# Patient Record
Sex: FEMALE | URBAN - METROPOLITAN AREA
[De-identification: names, ages, dates, MRNs, and addresses within clinical notes are randomized per-mention and may not be internally consistent; named-entity substitution may affect disease eponyms.]

---

## 2022-05-11 ENCOUNTER — TELEPHONE (OUTPATIENT)
Dept: PRIMARY CARE CLINIC | Age: 22
End: 2022-05-11

## 2022-05-11 NOTE — TELEPHONE ENCOUNTER
----- Message from Hilario Birmingham DO sent at 5/11/2022 11:42 AM EDT -----  Cancel patient's appt with me tomorrow and notify her that she cannot reschedule with me due to 2 new patient no shows

## 2022-08-03 ENCOUNTER — OFFICE VISIT (OUTPATIENT)
Dept: INTERNAL MEDICINE CLINIC | Age: 22
End: 2022-08-03
Payer: COMMERCIAL

## 2022-08-03 VITALS
TEMPERATURE: 98.6 F | BODY MASS INDEX: 31.36 KG/M2 | HEIGHT: 63 IN | OXYGEN SATURATION: 98 % | HEART RATE: 115 BPM | WEIGHT: 177 LBS | DIASTOLIC BLOOD PRESSURE: 84 MMHG | SYSTOLIC BLOOD PRESSURE: 138 MMHG

## 2022-08-03 DIAGNOSIS — F41.9 ANXIETY AND DEPRESSION: Primary | ICD-10-CM

## 2022-08-03 DIAGNOSIS — Z76.89 ENCOUNTER TO ESTABLISH CARE: ICD-10-CM

## 2022-08-03 DIAGNOSIS — F32.A ANXIETY AND DEPRESSION: Primary | ICD-10-CM

## 2022-08-03 DIAGNOSIS — F32.A ANXIETY AND DEPRESSION: ICD-10-CM

## 2022-08-03 DIAGNOSIS — F41.9 ANXIETY AND DEPRESSION: ICD-10-CM

## 2022-08-03 LAB
A/G RATIO: 1.7 (ref 1.1–2.2)
ALBUMIN SERPL-MCNC: 4.5 G/DL (ref 3.4–5)
ALP BLD-CCNC: 53 U/L (ref 40–129)
ALT SERPL-CCNC: 13 U/L (ref 10–40)
ANION GAP SERPL CALCULATED.3IONS-SCNC: 13 MMOL/L (ref 3–16)
AST SERPL-CCNC: 27 U/L (ref 15–37)
BASOPHILS ABSOLUTE: 0.1 K/UL (ref 0–0.2)
BASOPHILS RELATIVE PERCENT: 0.9 %
BILIRUB SERPL-MCNC: <0.2 MG/DL (ref 0–1)
BUN BLDV-MCNC: 7 MG/DL (ref 7–20)
CALCIUM SERPL-MCNC: 9.8 MG/DL (ref 8.3–10.6)
CHLORIDE BLD-SCNC: 104 MMOL/L (ref 99–110)
CO2: 25 MMOL/L (ref 21–32)
CREAT SERPL-MCNC: 0.7 MG/DL (ref 0.6–1.1)
EOSINOPHILS ABSOLUTE: 0.1 K/UL (ref 0–0.6)
EOSINOPHILS RELATIVE PERCENT: 2.3 %
GFR AFRICAN AMERICAN: >60
GFR NON-AFRICAN AMERICAN: >60
GLUCOSE BLD-MCNC: 99 MG/DL (ref 70–99)
HCT VFR BLD CALC: 35.4 % (ref 36–48)
HEMOGLOBIN: 11.1 G/DL (ref 12–16)
LYMPHOCYTES ABSOLUTE: 1.9 K/UL (ref 1–5.1)
LYMPHOCYTES RELATIVE PERCENT: 30.4 %
MCH RBC QN AUTO: 23.9 PG (ref 26–34)
MCHC RBC AUTO-ENTMCNC: 31.3 G/DL (ref 31–36)
MCV RBC AUTO: 76.4 FL (ref 80–100)
MONOCYTES ABSOLUTE: 0.4 K/UL (ref 0–1.3)
MONOCYTES RELATIVE PERCENT: 6.7 %
NEUTROPHILS ABSOLUTE: 3.7 K/UL (ref 1.7–7.7)
NEUTROPHILS RELATIVE PERCENT: 59.7 %
PDW BLD-RTO: 16.3 % (ref 12.4–15.4)
PLATELET # BLD: 296 K/UL (ref 135–450)
PMV BLD AUTO: 8.9 FL (ref 5–10.5)
POTASSIUM SERPL-SCNC: 4.5 MMOL/L (ref 3.5–5.1)
RBC # BLD: 4.64 M/UL (ref 4–5.2)
SODIUM BLD-SCNC: 142 MMOL/L (ref 136–145)
T4 FREE: 1.1 NG/DL (ref 0.9–1.8)
TOTAL PROTEIN: 7.1 G/DL (ref 6.4–8.2)
TSH REFLEX: 0.98 UIU/ML (ref 0.27–4.2)
WBC # BLD: 6.2 K/UL (ref 4–11)

## 2022-08-03 PROCEDURE — 99204 OFFICE O/P NEW MOD 45 MIN: CPT | Performed by: NURSE PRACTITIONER

## 2022-08-03 RX ORDER — HYDROXYZINE HYDROCHLORIDE 25 MG/1
25 TABLET, FILM COATED ORAL 3 TIMES DAILY PRN
COMMUNITY
End: 2022-08-24 | Stop reason: SDUPTHER

## 2022-08-03 SDOH — ECONOMIC STABILITY: FOOD INSECURITY: WITHIN THE PAST 12 MONTHS, THE FOOD YOU BOUGHT JUST DIDN'T LAST AND YOU DIDN'T HAVE MONEY TO GET MORE.: SOMETIMES TRUE

## 2022-08-03 SDOH — ECONOMIC STABILITY: FOOD INSECURITY: WITHIN THE PAST 12 MONTHS, YOU WORRIED THAT YOUR FOOD WOULD RUN OUT BEFORE YOU GOT MONEY TO BUY MORE.: SOMETIMES TRUE

## 2022-08-03 ASSESSMENT — PATIENT HEALTH QUESTIONNAIRE - PHQ9
SUM OF ALL RESPONSES TO PHQ QUESTIONS 1-9: 17
8. MOVING OR SPEAKING SO SLOWLY THAT OTHER PEOPLE COULD HAVE NOTICED. OR THE OPPOSITE, BEING SO FIGETY OR RESTLESS THAT YOU HAVE BEEN MOVING AROUND A LOT MORE THAN USUAL: 0
5. POOR APPETITE OR OVEREATING: 2
3. TROUBLE FALLING OR STAYING ASLEEP: 3
9. THOUGHTS THAT YOU WOULD BE BETTER OFF DEAD, OR OF HURTING YOURSELF: 0
4. FEELING TIRED OR HAVING LITTLE ENERGY: 0
10. IF YOU CHECKED OFF ANY PROBLEMS, HOW DIFFICULT HAVE THESE PROBLEMS MADE IT FOR YOU TO DO YOUR WORK, TAKE CARE OF THINGS AT HOME, OR GET ALONG WITH OTHER PEOPLE: 1
SUM OF ALL RESPONSES TO PHQ QUESTIONS 1-9: 17
1. LITTLE INTEREST OR PLEASURE IN DOING THINGS: 3
7. TROUBLE CONCENTRATING ON THINGS, SUCH AS READING THE NEWSPAPER OR WATCHING TELEVISION: 3
SUM OF ALL RESPONSES TO PHQ QUESTIONS 1-9: 17
SUM OF ALL RESPONSES TO PHQ QUESTIONS 1-9: 17
2. FEELING DOWN, DEPRESSED OR HOPELESS: 3
SUM OF ALL RESPONSES TO PHQ9 QUESTIONS 1 & 2: 6
6. FEELING BAD ABOUT YOURSELF - OR THAT YOU ARE A FAILURE OR HAVE LET YOURSELF OR YOUR FAMILY DOWN: 3

## 2022-08-03 ASSESSMENT — SOCIAL DETERMINANTS OF HEALTH (SDOH): HOW HARD IS IT FOR YOU TO PAY FOR THE VERY BASICS LIKE FOOD, HOUSING, MEDICAL CARE, AND HEATING?: HARD

## 2022-08-03 NOTE — PROGRESS NOTES
Patient: Alta Gannon is a 24 y.o. female who presents today with the following Chief Complaint(s):  Chief Complaint   Patient presents with    New Patient     Establish care       HPI  Presents to the office as a new patient to establish care. C/o depression, anxiety, ADHD. Has tried sertraline in the past which cause suicidal thoughts. Is taking hydroxyzine which does not help much. 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 Feels like she is being unproductive if she is not busy. - Feels like previous telehealth therapist did not provide much help. Mental Health Problem  The primary symptoms include dysphoric mood. The current episode started more than 1 month ago. This is a chronic problem. She characterizes the problem as moderate. The mood includes feelings of sadness and tearfulness. Her change in mood was precipitated by a stressful event. The onset of the illness is precipitated by emotional stress. The degree of incapacity that she is experiencing as a consequence of her illness is moderate. Sequelae of the illness include harmed interpersonal relations. Additional symptoms of the illness include anhedonia, fatigue, feelings of worthlessness and attention impairment. She does not admit to suicidal ideas. She does not have a plan to attempt suicide. She does not contemplate harming herself. She has not already injured self. She does not contemplate injuring another person. She has not already  injured another person. Risk factors that are present for mental illness include a history of mental illness (PTSD). Current Outpatient Medications   Medication Sig Dispense Refill    hydrOXYzine HCl (ATARAX) 25 MG tablet Take 25 mg by mouth 3 times daily as needed for Itching       No current facility-administered medications for this visit. Patient's past medical history, surgical history, family history, medications,  and allergies  were all reviewed and updated as appropriate today.     There is no problem list on file for this patient. No past medical history on file. No past surgical history on file. No family history on file. Allergies   Allergen Reactions    Latex     Zinc Gelatin [Zinc]          Review of Systems   Constitutional:  Positive for fatigue. HENT: Negative. Respiratory: Negative. Cardiovascular: Negative. Gastrointestinal: Negative. Genitourinary: Negative. Musculoskeletal: Negative. Skin: Negative. Neurological: Negative. Psychiatric/Behavioral:  Positive for decreased concentration and dysphoric mood. Negative for self-injury and suicidal ideas. The patient is nervous/anxious. Vitals:    08/03/22 1030   BP: 138/84   Site: Left Upper Arm   Position: Sitting   Cuff Size: Medium Adult   Pulse: (!) 115   Temp: 98.6 °F (37 °C)   TempSrc: Temporal   SpO2: 98%   Weight: 177 lb (80.3 kg)   Height: 5' 3\" (1.6 m)     Physical Exam  Constitutional:       General: She is not in acute distress. Appearance: Normal appearance. She is not ill-appearing, toxic-appearing or diaphoretic. HENT:      Head: Normocephalic. Cardiovascular:      Rate and Rhythm: Regular rhythm. Tachycardia present. Pulses: Normal pulses. Heart sounds: Normal heart sounds. No murmur heard. No friction rub. No gallop. Pulmonary:      Effort: Pulmonary effort is normal. No respiratory distress. Breath sounds: Normal breath sounds. No stridor. No wheezing, rhonchi or rales. Abdominal:      General: Bowel sounds are normal. There is no distension. Palpations: Abdomen is soft. There is no mass. Tenderness: There is no abdominal tenderness. There is no guarding. Hernia: No hernia is present. Musculoskeletal:         General: Normal range of motion. Cervical back: Normal range of motion and neck supple. No rigidity. Right lower leg: No edema. Left lower leg: No edema. Skin:     General: Skin is warm and dry.    Neurological:      Mental Status: She is alert and oriented to person, place, and time. Psychiatric:         Attention and Perception: Attention and perception normal.         Mood and Affect: Mood is anxious. Affect is tearful. Speech: Speech normal.         Behavior: Behavior normal.         Thought Content: Thought content normal.          Assessment/Plan:  1. Anxiety and depression  - TSH with Reflex; Future  - T4, Free; Future  - CBC with Auto Differential; Future  - Comprehensive Metabolic Panel; Future  - External Referral to Psychiatry    2. Encounter to establish care        Return in about 2 months (around 10/3/2022), or if symptoms worsen or fail to improve.

## 2022-08-03 NOTE — PATIENT INSTRUCTIONS
I will send a message or call if your lab work is abnormal.     [facility] Laboratory Locations - No appointment necessary. @ indicates the location is open Saturdays in addition to Monday through Friday. Call your preferred location for test preparation, business hours and other information you need. [employer] accepts [employer]. [facility]    @ [place] Lab Svcs. [address] [place], [facility] [address]   Ph: [phone] [facility] Lab Svcs. [address]   Ph: [phone]  @ [facility] Lab Svcs. 3[address] [facility]   Ph: [phone]    [facility] Lab Svcs. [address]   Ph: [phone] @ [place] Lab Svcs. [address]  Ph: [phone] @ [place] [facility] Svcs. [address], [place] [facility] 429   Ph: [phone]     Mikael Vergara [facility]. [facility] [address]  Ph: [phone]    [place]   @ [place] Lab Svcs. [address] [place], [place] [address]   Ph: [phone] Karen Roberts Med. Office [facility]. [address] [place] [address] Karen Roberts, [address] [place] [facility]  Ph: [address]:  [address] Lab Svcs. 54 [facility]   Ph: [phone] [facility]. Lab Svcs.   [address] [facility], [facility]   Ph: [phone]

## 2022-08-05 ASSESSMENT — ENCOUNTER SYMPTOMS
GASTROINTESTINAL NEGATIVE: 1
RESPIRATORY NEGATIVE: 1

## 2022-08-24 ENCOUNTER — OFFICE VISIT (OUTPATIENT)
Dept: PSYCHIATRY | Age: 22
End: 2022-08-24
Payer: COMMERCIAL

## 2022-08-24 VITALS
OXYGEN SATURATION: 99 % | HEART RATE: 111 BPM | WEIGHT: 177 LBS | DIASTOLIC BLOOD PRESSURE: 80 MMHG | SYSTOLIC BLOOD PRESSURE: 118 MMHG | BODY MASS INDEX: 31.35 KG/M2

## 2022-08-24 DIAGNOSIS — F32.A DEPRESSION, UNSPECIFIED DEPRESSION TYPE: ICD-10-CM

## 2022-08-24 DIAGNOSIS — F41.1 GAD (GENERALIZED ANXIETY DISORDER): Primary | ICD-10-CM

## 2022-08-24 DIAGNOSIS — F41.0 PANIC DISORDER: ICD-10-CM

## 2022-08-24 PROCEDURE — 99205 OFFICE O/P NEW HI 60 MIN: CPT | Performed by: REGISTERED NURSE

## 2022-08-24 PROCEDURE — G8427 DOCREV CUR MEDS BY ELIG CLIN: HCPCS | Performed by: REGISTERED NURSE

## 2022-08-24 PROCEDURE — 1036F TOBACCO NON-USER: CPT | Performed by: REGISTERED NURSE

## 2022-08-24 PROCEDURE — G8417 CALC BMI ABV UP PARAM F/U: HCPCS | Performed by: REGISTERED NURSE

## 2022-08-24 RX ORDER — HYDROXYZINE HYDROCHLORIDE 25 MG/1
25 TABLET, FILM COATED ORAL EVERY 8 HOURS PRN
Qty: 90 TABLET | Refills: 0 | Status: SHIPPED | OUTPATIENT
Start: 2022-08-24 | End: 2022-09-23

## 2022-08-24 RX ORDER — BUSPIRONE HYDROCHLORIDE 5 MG/1
5 TABLET ORAL 3 TIMES DAILY
Qty: 90 TABLET | Refills: 0 | Status: SHIPPED | OUTPATIENT
Start: 2022-08-24 | End: 2022-09-23

## 2022-08-24 ASSESSMENT — PATIENT HEALTH QUESTIONNAIRE - PHQ9
SUM OF ALL RESPONSES TO PHQ QUESTIONS 1-9: 10
4. FEELING TIRED OR HAVING LITTLE ENERGY: 2
9. THOUGHTS THAT YOU WOULD BE BETTER OFF DEAD, OR OF HURTING YOURSELF: 0
SUM OF ALL RESPONSES TO PHQ QUESTIONS 1-9: 10
8. MOVING OR SPEAKING SO SLOWLY THAT OTHER PEOPLE COULD HAVE NOTICED. OR THE OPPOSITE, BEING SO FIGETY OR RESTLESS THAT YOU HAVE BEEN MOVING AROUND A LOT MORE THAN USUAL: 0
2. FEELING DOWN, DEPRESSED OR HOPELESS: 1
5. POOR APPETITE OR OVEREATING: 3
SUM OF ALL RESPONSES TO PHQ QUESTIONS 1-9: 10
1. LITTLE INTEREST OR PLEASURE IN DOING THINGS: 0
7. TROUBLE CONCENTRATING ON THINGS, SUCH AS READING THE NEWSPAPER OR WATCHING TELEVISION: 0
3. TROUBLE FALLING OR STAYING ASLEEP: 2
6. FEELING BAD ABOUT YOURSELF - OR THAT YOU ARE A FAILURE OR HAVE LET YOURSELF OR YOUR FAMILY DOWN: 2
SUM OF ALL RESPONSES TO PHQ QUESTIONS 1-9: 10
SUM OF ALL RESPONSES TO PHQ9 QUESTIONS 1 & 2: 1

## 2022-08-24 ASSESSMENT — ANXIETY QUESTIONNAIRES
6. BECOMING EASILY ANNOYED OR IRRITABLE: 3
GAD7 TOTAL SCORE: 16
3. WORRYING TOO MUCH ABOUT DIFFERENT THINGS: 3
5. BEING SO RESTLESS THAT IT IS HARD TO SIT STILL: 0
7. FEELING AFRAID AS IF SOMETHING AWFUL MIGHT HAPPEN: 2
2. NOT BEING ABLE TO STOP OR CONTROL WORRYING: 3
1. FEELING NERVOUS, ANXIOUS, OR ON EDGE: 3
4. TROUBLE RELAXING: 2

## 2022-08-24 NOTE — PROGRESS NOTES
Psychiatry Initial Consultation    Patient Name: Indira Gomes  MRN: [id number]  Date of Service: 8/24/2022     Referring provider for consult: NATALIYA Matthew    Reason for Consult:  Anxiety, depression, panic d/o  Dx:  1. MARLENA (generalized anxiety disorder)  -     Vitamin D 25 Hydroxy; Future  -     Vitamin B12 & Folate; Future  2. Depression, unspecified depression type  3. Panic disorder  Assessment and plan    Anxiety, depression, and panic disorder  - Start on Buspar 5 mg TID. Patient can start at lower dose if can't tolerate it. - Continue Hydroxyzine 10-25 mg three times a day as needed for anxiety/panic d/o  - reviewed genesight test results with patient. - labs reviewed and ordered. - Medication R/B/SE discussed and patient gave verbal consent for tx.   - Practice complementary health approaches such as: self-management strategies, relaxation techniques, yoga, and physical exercise as tolerated. 2. Psychotherapy   - continue outpatient therapy as scheduled. - advised relaxation techniques and resources discussed. 3. Medical   - follow with PCP  4. RTC in 6 weeks or earlier if your symptoms fail to improve or go to nearest ER if having active SI/HI. Evaluated medications and assessed for side effects and effectiveness. Assessed patient's educational needs including reviewing plan of care, medications,diagnosis, treatment options, and prognosis. I reviewed the plan of care with the patient and the patient consented to the treatment interventions. Patient acknowledged, verbalized understanding, and agreed with plan of care. HPI:   This is a 24 y.o., female  here today for psychiatric evaluation onsite at [address] [place] [address] E MD . The patient is here for anxiety, depression and panic d/o evaluation and tx. She states mental health issues \" runs in the family. \" She had ED visit last year with panic attacks.  She was following counseling through students' service at the [facility] [employer] [facility] and later established with psych NP at [address] [place] [address] in which she has been getting medication management and counseling services. She reflected her experiences and services at [address] [place] [address]. She unsatisfied with the care she has been receiving at [facility] which lead to today's visit with me. She tried Zoloft for 5 days and stopped it as it increased SI. She has been taking Atarax/hydroxyzine 10-25 mg three times a day but she she states \" it makes me drowsy. \" There have been lots stress factor since last year. Her mom had stoke in Oct 2021 and been through rehab and recovering fairly well. Her relationship with her dad has not been great due to his past alcohol abuse. She calls and talks to her mom regularly. She moved her to [place] in August 2020 to pursue her undergraduate education at [employer]. She has been working up to 60 hours a week and going FT school. The expectations from her job ( now works at [employer]) and school have been on a rise. There are school and work related stress. She experienced her first panic attacks in Dec 2021 associated with Chest tightness, SOB, hyperventilating and other physical symptoms. She reports hydroxyzine has been helping to manage anxiety attacks. She feels depression symptoms are better now. However, she reports difficulty of falling asleep unless she takes Atarax. Sometimes she does not eat well and at times eat more than her usual food portion. She endorses feeling bad about her self. She feels anxious, nervous, worrying too much, and easily annoyed or irritable. She was in tears during the interview at times. She is very skeptical about antidepressants or anxiety medication. Discussed with the patient in detail the side effects, how long it takes to work, and medication compliance. The patient denies SI/HI/AVH. She denies maribell, hypomania, or delusions.      Psychiatric Focused ROS:  Depressive sxs: depressed mood, insomnia, feeling bad about her self  Manic or hypomanic sxs : denies   Anxiety sxs:      Constant worry:Yes     Irritability/ edgy:Yes     Disrupted sleep:Yes     Somatic/ tension: yes     Restless/ fatigue:Yes  Psychotic sxs: Denies AVH, paranoia, delusions  ADHD: denies   PTSD: denies   Context:  office visit  Location: mind- anxiety, depressed mood, insomnia  Duration/Timing:  intermittent. Severity/ character: moderate   Associated symptoms:  As above  Modifying factors: progression of illness, life stress, work and school stress. Disposition: The patient does not require inpatient psychiatric admission. Risk factors: Anxiety, depression, panic d/o, age < 22 y.o . She is not currently an imminent safety risk as she denies SI/HI, is future oriented and expresses a desire to get better and healthier. Protective factors: family    Past Psychiatric History:    Previous Diagnoses: denies   Previous Hospitalizations: Dec 2021 with panic attacks and left without seen as it took them to be seen. Outpatient Treatment: [facility] with Paul Veloz ( psych NP) , [facility] ( Temporary counseling student service)   Past Medication Trials: Zoloft ( ^ SI),   Suicidality: denies   History of Violence: denies   Family Hx psychiatric disorders: dad- alcoholic, uncle- schizophrenia  Brother- \" psychotic breaks\"  Family hx SA/ completed suicides: brother- attempted suicide. Past Medical History:  No past medical history on file. Home Medications:  Prior to Admission medications    Medication Sig Start Date End Date Taking?  Authorizing Provider   busPIRone (BUSPAR) 5 MG tablet Take 1 tablet by mouth 3 times daily 8/24/22 9/23/22 Yes NATALIYA Bowden CNP   hydrOXYzine HCl (ATARAX) 25 MG tablet Take 1 tablet by mouth every 8 hours as needed for Anxiety 8/24/22 9/23/22 Yes NATALIYA Bowden CNP      Allergies  Allergies   Allergen Reactions    Latex     Zinc Gelatin [Zinc]       Chemical Dependency History:   Social History     Tobacco Use    Smoking status: Never Passive exposure: Never    Smokeless tobacco: Never   Substance Use Topics    Alcohol use: Never        Illicit: denies     ETOH:denies     Nicotine: denies     Caffeine: none. Family Hx:    No family history on file. Social Hx:     Developmental: Born [place] raised in [place], [place]. Moved to [place] in August 2020 when she accepted . Marital Status: single. Children: none    Housing: lives with three roommates off [address] [facility]. Educational: Senior at [employer] and minor in [address] [place] [address] Road: student     Abuse/Trauma: difficult relationship with her dad due to dad being alcoholic. Legal: denies     Gun: No access to gun or own a gun. Current Medications Ordered:  No current outpatient medications on file prior to visit. No current facility-administered medications on file prior to visit. ROS: + CP, constipation- during panic attacks, anxiety. PE:    /80 (Site: Right Upper Arm, Position: Sitting, Cuff Size: Large Adult)   Pulse (!) 111   Wt 177 lb (80.3 kg)   SpO2 99%   BMI 31.35 kg/m²     MARLENA 7 SCORE 8/24/2022   MARLENA-7 Total Score 16     Interpretation of MARLENA-7 score: 5-9 = mild anxiety, 10-14 = moderate anxiety,   15+ = severe anxiety. Recommend referral to behavioral health for scores 10 or greater. PHQ-9 Total Score: 10 (8/24/2022  9:47 AM)  Thoughts that you would be better off dead, or of hurting yourself in some way: Not at all (8/24/2022  9:47 AM)  Interpretation of PHQ-9 score:  1-4 = minimal depression, 5-9 = mild depression,   10-14 = moderate depression; 15-19 = moderately severe depression, 20-27 = severe depression  Motor / Gait: no abnormalities noted, normal gait and station  AIMS: 0  LAB: up to date     Mental Status Examination  Appearance    alert, cooperative  Motor: Normal strength and tone, No abnormal movements, tics or mannerisms.   Speech    spontaneous  Mood/Affect    Anxious  Irritability / anxiety  Thought Process    linear, goal directed, and coherent  Thought Content    intact , no suicidal ideation  Associations    logical connections  Attention/Concentration    intact  Memory    recent and remote memory intact  Insight/Judgement    Good / Intact    Safety plan  Discussed and educated patient to call 911 or go to nearest emergency room if patient experiences SI/HI immediately. In addition, Patient educated to use the National Suicide Hotlines: 1-107-010-TALK (0-610.115.4121) and 7-131-LMBVWNW (3-327.836.6466) and Local psychiatric Emergency Services given to patient during the office visit. Total time spent on this encounter: Not billed by time    Thank you for consult. Please do not hesitate to contact provider if there are additional questions regarding patient.     Ignacio Vuong DNP, PMHNP-BC, CNP  8/24/2022

## 2022-10-03 ENCOUNTER — OFFICE VISIT (OUTPATIENT)
Dept: INTERNAL MEDICINE CLINIC | Age: 22
End: 2022-10-03
Payer: COMMERCIAL

## 2022-10-03 VITALS
DIASTOLIC BLOOD PRESSURE: 78 MMHG | SYSTOLIC BLOOD PRESSURE: 112 MMHG | OXYGEN SATURATION: 99 % | BODY MASS INDEX: 31.89 KG/M2 | HEIGHT: 63 IN | WEIGHT: 180 LBS | HEART RATE: 93 BPM

## 2022-10-03 DIAGNOSIS — F41.9 ANXIETY AND DEPRESSION: Primary | ICD-10-CM

## 2022-10-03 DIAGNOSIS — F32.A ANXIETY AND DEPRESSION: Primary | ICD-10-CM

## 2022-10-03 PROCEDURE — G8484 FLU IMMUNIZE NO ADMIN: HCPCS | Performed by: NURSE PRACTITIONER

## 2022-10-03 PROCEDURE — 1036F TOBACCO NON-USER: CPT | Performed by: NURSE PRACTITIONER

## 2022-10-03 PROCEDURE — G8427 DOCREV CUR MEDS BY ELIG CLIN: HCPCS | Performed by: NURSE PRACTITIONER

## 2022-10-03 PROCEDURE — G8417 CALC BMI ABV UP PARAM F/U: HCPCS | Performed by: NURSE PRACTITIONER

## 2022-10-03 PROCEDURE — 99213 OFFICE O/P EST LOW 20 MIN: CPT | Performed by: NURSE PRACTITIONER

## 2022-10-03 RX ORDER — HYDROXYZINE HYDROCHLORIDE 25 MG/1
25 TABLET, FILM COATED ORAL 3 TIMES DAILY PRN
COMMUNITY
End: 2022-10-05 | Stop reason: DRUGHIGH

## 2022-10-03 RX ORDER — BUSPIRONE HYDROCHLORIDE 5 MG/1
5 TABLET ORAL 3 TIMES DAILY
COMMUNITY
End: 2022-10-05 | Stop reason: DRUGHIGH

## 2022-10-03 NOTE — PATIENT INSTRUCTIONS
Try to avoid excess snacking and focus on healthy foods  Avoid screen time 1-2 hours before bed    [facility] Laboratory Locations - No appointment necessary. @ indicates the location is open Saturdays in addition to Monday through Friday. Call your preferred location for test preparation, business hours and other information you need. [employer] accepts [employer]. [facility]    @ [place] Lab Svcs. 3 [facility] [id number] [place] [address]. [place], [address]   Ph: [phone] [facility] Lab Svcs. [address]   Ph: [phone]  @ [place] Lab Svcs. 3[address] [facility]   Ph: [phone]    [facility] Lab Svcs. 2001 [facility], [address]   Ph: [phone] @ [place] Lab Svcs. [address]  Ph: [phone] @ [facility] Lab Svcs. 3215 [facility]. [address] Barker [address]   Ph: [phone]     Derrek Ray Svcs. [place] [address]  Ph: [phone]    [place]   @ [place] Lab Svcs. [address] [place], [place] [address]   Ph: [phone] [facility] Med. Office Bldg. [address] [facility], 800 [facility]  Ph: [phone] [facility] 95, [phone]   [facility]:  [address] Lab Svcs. 54 [facility]   Ph: [phone] [facility]. Lab Svcs.   211 [facility], [address] [place] [address]   Ph: [phone]

## 2022-10-03 NOTE — PROGRESS NOTES
Patient: Terrace Mcardle is a 24 y.o. female who presents today with the following Chief Complaint(s):  Chief Complaint   Patient presents with    Follow-up     2 mth f/u       HPI  Presents to the office for anxiety follow up. She is being followed by psychiatry. Taking buspirone and hydroxyzine. Says she is now having trouble sleeping. Believes it is r/t screen time. She is usually on her phone until 11pm and has trouble falling asleep. Current Outpatient Medications   Medication Sig Dispense Refill    busPIRone (BUSPAR) 5 MG tablet Take 5 mg by mouth 3 times daily      hydrOXYzine HCl (ATARAX) 25 MG tablet Take 25 mg by mouth 3 times daily as needed for Itching       No current facility-administered medications for this visit. Patient's past medical history, surgical history, family history, medications,  and allergies  were all reviewed and updated as appropriate today. There is no problem list on file for this patient. Past Medical History:   Diagnosis Date    Anxiety     Depression       No past surgical history on file. No family history on file. Allergies   Allergen Reactions    Latex     Zinc Gelatin [Zinc]          Review of Systems   Constitutional:  Positive for fatigue. HENT: Negative. Respiratory: Negative. Cardiovascular: Negative. Gastrointestinal: Negative. Genitourinary: Negative. Musculoskeletal: Negative. Skin: Negative. Neurological: Negative. Psychiatric/Behavioral:  Positive for decreased concentration, dysphoric mood and sleep disturbance. Negative for self-injury and suicidal ideas. The patient is nervous/anxious. Vitals:    10/03/22 1637   BP: 112/78   Site: Left Upper Arm   Position: Sitting   Cuff Size: Medium Adult   Pulse: 93   SpO2: 99%   Weight: 180 lb (81.6 kg)   Height: 5' 3\" (1.6 m)     Physical Exam  Constitutional:       General: She is not in acute distress. Appearance: Normal appearance.  She is not ill-appearing, toxic-appearing or diaphoretic. HENT:      Head: Normocephalic. Cardiovascular:      Rate and Rhythm: Normal rate and regular rhythm. Pulses: Normal pulses. Heart sounds: Normal heart sounds. Pulmonary:      Effort: Pulmonary effort is normal. No respiratory distress. Breath sounds: Normal breath sounds. No stridor. No wheezing, rhonchi or rales. Abdominal:      General: Bowel sounds are normal. There is no distension. Palpations: Abdomen is soft. There is no mass. Tenderness: There is no abdominal tenderness. There is no guarding. Hernia: No hernia is present. Musculoskeletal:         General: Normal range of motion. Cervical back: Normal range of motion and neck supple. No rigidity. Skin:     General: Skin is warm and dry. Neurological:      Mental Status: She is alert and oriented to person, place, and time. Psychiatric:         Attention and Perception: Attention and perception normal.         Mood and Affect: Mood and affect normal.         Speech: Speech normal.         Behavior: Behavior normal.         Thought Content: Thought content normal.          Assessment/Plan:  1. Anxiety and depression  - Have labs drawn  - Continue buspirone and hydroxyzine  - F/u with psychiatry       Return if symptoms worsen or fail to improve.

## 2022-10-04 ASSESSMENT — ENCOUNTER SYMPTOMS
GASTROINTESTINAL NEGATIVE: 1
RESPIRATORY NEGATIVE: 1

## 2022-10-05 ENCOUNTER — OFFICE VISIT (OUTPATIENT)
Dept: PSYCHIATRY | Age: 22
End: 2022-10-05
Payer: COMMERCIAL

## 2022-10-05 VITALS
DIASTOLIC BLOOD PRESSURE: 78 MMHG | SYSTOLIC BLOOD PRESSURE: 118 MMHG | BODY MASS INDEX: 32.24 KG/M2 | OXYGEN SATURATION: 95 % | HEART RATE: 54 BPM | WEIGHT: 182 LBS

## 2022-10-05 DIAGNOSIS — F41.1 GAD (GENERALIZED ANXIETY DISORDER): Primary | ICD-10-CM

## 2022-10-05 DIAGNOSIS — F41.0 PANIC DISORDER: ICD-10-CM

## 2022-10-05 DIAGNOSIS — F41.1 GAD (GENERALIZED ANXIETY DISORDER): ICD-10-CM

## 2022-10-05 DIAGNOSIS — F33.0 MILD EPISODE OF RECURRENT MAJOR DEPRESSIVE DISORDER (HCC): ICD-10-CM

## 2022-10-05 LAB
FOLATE: 12.84 NG/ML (ref 4.78–24.2)
VITAMIN B-12: 518 PG/ML (ref 211–911)
VITAMIN D 25-HYDROXY: 14.8 NG/ML

## 2022-10-05 PROCEDURE — G8427 DOCREV CUR MEDS BY ELIG CLIN: HCPCS | Performed by: REGISTERED NURSE

## 2022-10-05 PROCEDURE — 99213 OFFICE O/P EST LOW 20 MIN: CPT | Performed by: REGISTERED NURSE

## 2022-10-05 PROCEDURE — 1036F TOBACCO NON-USER: CPT | Performed by: REGISTERED NURSE

## 2022-10-05 PROCEDURE — G8484 FLU IMMUNIZE NO ADMIN: HCPCS | Performed by: REGISTERED NURSE

## 2022-10-05 PROCEDURE — G8417 CALC BMI ABV UP PARAM F/U: HCPCS | Performed by: REGISTERED NURSE

## 2022-10-05 RX ORDER — BUSPIRONE HYDROCHLORIDE 5 MG/1
5 TABLET ORAL 3 TIMES DAILY
Qty: 180 TABLET | Status: SHIPPED | COMMUNITY
Start: 2022-10-05 | End: 2022-10-05 | Stop reason: SDUPTHER

## 2022-10-05 RX ORDER — HYDROXYZINE HYDROCHLORIDE 25 MG/1
25 TABLET, FILM COATED ORAL EVERY 8 HOURS PRN
Qty: 90 TABLET | Refills: 2 | Status: SHIPPED | COMMUNITY
Start: 2022-10-05 | End: 2022-10-05 | Stop reason: SDUPTHER

## 2022-10-05 RX ORDER — HYDROXYZINE HYDROCHLORIDE 25 MG/1
25 TABLET, FILM COATED ORAL EVERY 8 HOURS PRN
Qty: 90 TABLET | Refills: 2 | Status: SHIPPED | OUTPATIENT
Start: 2022-10-05

## 2022-10-05 RX ORDER — BUSPIRONE HYDROCHLORIDE 5 MG/1
5 TABLET ORAL 3 TIMES DAILY
Qty: 270 TABLET | Refills: 0 | Status: SHIPPED | OUTPATIENT
Start: 2022-10-05 | End: 2023-01-03

## 2022-10-05 NOTE — PROGRESS NOTES
PSYCHIATRY OUT PATIENT FOLLOW UP    Patient name: Alexandria Interiano  : 2000  Date of service: 10/05/22  PCP: NATALIYA Godoy    Dx:  1. MARLENA (generalized anxiety disorder)  2. Mild episode of recurrent major depressive disorder (RUSTca 75.)  3. Panic disorder    Assessment and plan  Psychiatric   -Continue on Buspar 5 mg TID. Patient can start at lower dose if can't tolerate it.   -Continue hydroxyzine 10-25 mg three times a day as needed for anxiety/panic d/o  - take Buspar and hydroxyzine at least one hour apart. - Medication R/B/SE discussed and patient gave verbal consent for tx.   - Practice complementary health approaches such as: self-management strategies, relaxation techniques, yoga, and physical exercise as tolerated. 2. Psychotherapy   - continue outpatient therapy as scheduled. She attends every two weeks. - advised relaxation techniques and resources discussed. 3. Medical   - follow with PCP  -- Medication R/B/SE discussed and patient gave verbal consent for tx.  3. RTC in months or earlier if your symptoms fail to improve or go to nearest ER if having active SI/HI. Evaluated medications and assessed for side effects and effectiveness. Assessed patient's educational needs including reviewing plan of care, medications,diagnosis, treatment options, and prognosis. I reviewed the plan of care with the patient and the patient consented to the treatment interventions. Patient acknowledged, verbalized understanding, and agreed with plan of care. Interval progress:   Lisa Josue is here for a follow up MARLENA, depression, panic disorder. She reports feeling better with the anxiety symptoms. She does not experience panic attacks as she used to had before in the past.  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 She quit her job [employer] and rehired at [employer] that she will starts in two weeks.  She has been taking Buspar 2.5 mg BID and Atarax 12.5 mg BID instead of q8 hours as needed. Discussed increasing the dose of Buspar to 5 mg BID> TID and take Atarax 25 mg TID PRN. She states compliant with medications. Overall, she is feeling positive and future oriented. She denies SI/HI/AVH. She denies maribell, or delusions. Interim  call/message: none     Context: OV  Location: in mind- anxiety and panic disorder. Duration/time: chronic   Severity: moderate   Associated sxs: as above    Brief Medical Hx:     Past Medical History:   Diagnosis Date    Anxiety     Depression        ROS: no headaches, vision problems, dysuria, abd pain, chest pain or SOB    Past Psych Medications trial: Zoloft ( ^ SI)    Current Outpatient Medications   Medication Sig Dispense Refill    hydrOXYzine HCl (ATARAX) 25 MG tablet Take 1 tablet by mouth every 8 hours as needed for Anxiety 90 tablet 2    busPIRone (BUSPAR) 5 MG tablet Take 1 tablet by mouth 3 times daily 270 tablet 0     No current facility-administered medications for this visit. O:  Wt Readings from Last 3 Encounters:   10/05/22 182 lb (82.6 kg)   10/03/22 180 lb (81.6 kg)   08/24/22 177 lb (80.3 kg)     Temp Readings from Last 3 Encounters:   08/03/22 98.6 °F (37 °C) (Temporal)     BP Readings from Last 3 Encounters:   10/05/22 118/78   10/03/22 112/78   08/24/22 118/80     Pulse Readings from Last 3 Encounters:   10/05/22 54   10/03/22 93   08/24/22 (!) 111       Aims: 0  Labs: Up to date    Mental Status Exam:   Appearance    alert, cooperative  Motor: Normal strength and tone, No abnormal movements, tics or mannerisms. Speech    spontaneous  Mood/Affect    Anxious / anxiety  Thought Process    linear, goal directed, and coherent  Thought Content    intact , no suicidal ideation  Associations    logical connections  Attention/Concentration    intact  Memory    recent and remote memory intact  Insight/Judgement    Good / Intact    Safety: 911, PES, hotlines, and interventions discussed today.    Risk factors:  Anxiety, depression, panic d/o, age < 22 y.o   Protective factors: Denies current or recent active suicidal ideation, does not have lethal plan, patient is tom for safety, patient has social or family support, no active psychosis or cognitive dysfunction, physically healthy, compliant with recommended medications, and patient is future-oriented. Total time spent on this encounter: not billed by time      Thank you for consult. Please do not hesitate to contact provider if there are additional questions regarding patient.      Bekah Francisco DNP, PMHNP-BC, CNP   [facility]            10/05/22

## 2022-10-10 DIAGNOSIS — E55.9 VITAMIN D DEFICIENCY: Primary | ICD-10-CM

## 2022-10-10 RX ORDER — ACETAMINOPHEN 160 MG
1 TABLET,DISINTEGRATING ORAL DAILY
Qty: 90 CAPSULE | Refills: 1 | Status: SHIPPED | OUTPATIENT
Start: 2022-10-10 | End: 2022-10-11

## 2022-10-11 DIAGNOSIS — E55.9 VITAMIN D DEFICIENCY: Primary | ICD-10-CM

## 2022-10-11 RX ORDER — CHOLECALCIFEROL (VITAMIN D3) 125 MCG
1 CAPSULE ORAL DAILY
Qty: 90 TABLET | Refills: 1 | Status: SHIPPED | OUTPATIENT
Start: 2022-10-11

## 2022-11-14 DIAGNOSIS — E55.9 VITAMIN D DEFICIENCY: ICD-10-CM

## 2022-11-14 RX ORDER — CHOLECALCIFEROL (VITAMIN D3) 125 MCG
1 CAPSULE ORAL DAILY
Qty: 90 TABLET | Refills: 1 | Status: SHIPPED | OUTPATIENT
Start: 2022-11-14

## 2023-01-04 ENCOUNTER — OFFICE VISIT (OUTPATIENT)
Dept: PSYCHIATRY | Age: 23
End: 2023-01-04
Payer: COMMERCIAL

## 2023-01-04 VITALS
OXYGEN SATURATION: 95 % | SYSTOLIC BLOOD PRESSURE: 122 MMHG | HEART RATE: 88 BPM | HEIGHT: 63 IN | DIASTOLIC BLOOD PRESSURE: 84 MMHG | WEIGHT: 172 LBS | BODY MASS INDEX: 30.48 KG/M2

## 2023-01-04 DIAGNOSIS — F41.1 GAD (GENERALIZED ANXIETY DISORDER): Primary | ICD-10-CM

## 2023-01-04 DIAGNOSIS — F33.0 MILD EPISODE OF RECURRENT MAJOR DEPRESSIVE DISORDER (HCC): ICD-10-CM

## 2023-01-04 DIAGNOSIS — F41.0 PANIC DISORDER: ICD-10-CM

## 2023-01-04 PROCEDURE — G8417 CALC BMI ABV UP PARAM F/U: HCPCS | Performed by: REGISTERED NURSE

## 2023-01-04 PROCEDURE — G8484 FLU IMMUNIZE NO ADMIN: HCPCS | Performed by: REGISTERED NURSE

## 2023-01-04 PROCEDURE — 1036F TOBACCO NON-USER: CPT | Performed by: REGISTERED NURSE

## 2023-01-04 PROCEDURE — 99213 OFFICE O/P EST LOW 20 MIN: CPT | Performed by: REGISTERED NURSE

## 2023-01-04 PROCEDURE — G8427 DOCREV CUR MEDS BY ELIG CLIN: HCPCS | Performed by: REGISTERED NURSE

## 2023-01-04 RX ORDER — BUSPIRONE HYDROCHLORIDE 5 MG/1
5 TABLET ORAL 2 TIMES DAILY
Qty: 180 TABLET | Refills: 0 | Status: SHIPPED | OUTPATIENT
Start: 2023-01-04 | End: 2023-04-04

## 2023-01-04 NOTE — PROGRESS NOTES
PSYCHIATRY OUT PATIENT FOLLOW UP    Patient name: Julissa Villalobos  : 2000  Date of service: 23  PCP: NATALIYA Ruiz    Dx:  1. MARLENA (generalized anxiety disorder)  2. Mild episode of recurrent major depressive disorder ([facility].)  3. Panic disorder    Assessment and plan  Psychiatric   - decrease Buspar 2.5 mg in AM and continue Buspar 5 mg at bed time. -Continue hydroxyzine 10-25 mg three times a day as needed for anxiety/panic d/o  - take Buspar and hydroxyzine at least one hour apart. - Medication R/B/SE discussed and patient gave verbal consent for tx.   - Practice complementary health approaches such as: self-management strategies, relaxation techniques, yoga, and physical exercise as tolerated. 2. Psychotherapy   - continue outpatient therapy as scheduled. [facility]. - advised relaxation techniques and resources discussed. 3. Substance   - No reported abuse issue. 4. Medical   - follow with PCP  -- Medication R/B/SE discussed and patient gave verbal consent for tx.  5. RTC in 3-4 months or earlier if your symptoms fail to improve or go to nearest ER if having active SI/HI. Evaluated medications and assessed for side effects and effectiveness. Assessed patient's educational needs including reviewing plan of care, medications,diagnosis, treatment options, and prognosis. I reviewed the plan of care with the patient and the patient consented to the treatment interventions. Patient acknowledged, verbalized understanding, and agreed with plan of care. Interval progress:   Mercado Line is here for a follow up MARLENA, depression, panic disorder. She reports getting tired with taking Buspar 5 mg BID. She feels tired and fatigue. Depressive symptoms better. She does not take Hydroxyzine 25 mg three times a day. She works at daily in [email or web address] in her apartment. She denies heart palpitations.  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 She aims to graduate in Spring of this year or later. Overall, she is feeling positive and future oriented. She denies SI/HI/AVH. She denies maribell, or delusions. Interim  call/message:   11/14/22: \" I feel good when I take the meds 2x per day but now Im more forgetful and always have to right stuff down since Im not worried or anxious as much . Is that a common thing ? \"     Context: OV  Location: in mind- anxiety and panic disorder. Duration/time: chronic   Severity: moderate   Associated sxs: as above    Brief Medical Hx:     Past Medical History:   Diagnosis Date    Anxiety     Depression        ROS: Denies headaches, vision problems, dysuria, abd pain, chest pain or SOB    Past Psych Medications trial: Zoloft ( ^ SI)    Current Outpatient Medications   Medication Sig Dispense Refill    busPIRone (BUSPAR) 5 MG tablet Take 1 tablet by mouth 2 times daily 180 tablet 0    Cholecalciferol (VITAMIN D3) 50 MCG (2000 UT) TABS Take 1 tablet by mouth daily 90 tablet 1    hydrOXYzine HCl (ATARAX) 25 MG tablet Take 1 tablet by mouth every 8 hours as needed for Anxiety 90 tablet 2     No current facility-administered medications for this visit. O:  Wt Readings from Last 3 Encounters:   01/04/23 172 lb (78 kg)   10/05/22 182 lb (82.6 kg)   10/03/22 180 lb (81.6 kg)     Temp Readings from Last 3 Encounters:   08/03/22 98.6 °F (37 °C) (Temporal)     BP Readings from Last 3 Encounters:   01/04/23 122/84   10/05/22 118/78   10/03/22 112/78     Pulse Readings from Last 3 Encounters:   01/04/23 88   10/05/22 54   10/03/22 93       Aims: 0  Labs: Up to date    Mental Status Exam:   Appearance    alert, cooperative  Motor: Normal strength and tone, No abnormal movements, tics or mannerisms.   Speech    spontaneous  Mood/Affect    Anxious / anxiety  Thought Process    linear, goal directed, and coherent  Thought Content    intact , no suicidal ideation  Associations    logical connections  Attention/Concentration    intact  Memory    recent and remote memory intact  Insight/Judgement    Good / Intact    Safety: 911, 988, PES, hotlines, and interventions discussed today. Risk factors:  Anxiety, depression, panic d/o, age < 22 y.o   Protective factors: Denies current or recent active suicidal ideation, does not have lethal plan, patient is tom for safety, patient has social or family support, no active psychosis or cognitive dysfunction, physically healthy, compliant with recommended medications, and patient is future-oriented. Total time spent on this encounter: not billed by time      Thank you for consult. Please do not hesitate to contact provider if there are additional questions regarding patient.      Marta Petit DNP, PMHNP-BC, CNP   [facility]            01/04/23

## 2023-01-04 NOTE — PATIENT INSTRUCTIONS
[facility] Laboratory Locations - No appointment necessary. @ indicates the location is open Saturdays in addition to Monday through Friday. Call your preferred location for test preparation, business hours and other information you need. [employer] accepts [employer]. [facility]    @ [place] [facility] Svcs. [address] Kadie Anton. [place], [address]   Ph: [phone] [facility] Svcs. [address]   Ph: [phone]  @ [facility] Svcs. [address] [facility]   Ph: [phone]    [facility] Svcs. [address]   Ph: [phone] @ [place] [facility] Svcs. [address]  Ph: [phone] @ [facility] Svcs. [address] [place] [address]. [address]   Ph: [phone]     [facility] Svcs. [place] [address]ie [address]  Ph: [phone]    [place]   @ [employer] Lab Svcs. [address] [place], [place] [address]   Ph: [phone] [address] Med. Office Bldg. 3280 [facility] [address], [address]  Ph: [address] [place] [address]   [facility]:  [address] Lab Svcs[address] 54 [facility]   Ph: [phone] [facility]. Lab Svcs. [address] [place], [place] [address]   Ph: [phone]     Anti-depressant drugs:  This class of drugs can cause sedation, blurred vision, dry-mouth, constipation, postural hypotension, urinary retention, tachycardia, muscle tremors, agitation, headache, skin rash, photo sensitivity, excessive weight gain, glaucoma, heart disease, lowering seizure threshold, increase risk of suicidal thinking or ideations, excessive sweating, sextual dysfunction, insomnia, anxiety, bruxism, GI bleeding, pregnancy complications and birth defects, possible death, etc.      Here are some of Psychiatric Emergency resources for you:     National suicide hotlines: 65, 2-751-369-TALK (6-784.825.5364) and 3-112-KOOBANN (1-384.945.9291). 2.  Call 911 or go to any nearest emergency room   3.    Access the [facility] Emergency Psychiatry Services:     - Go to the [facility] Psychiatric Emergency Services (PES) at [address], [address] [facility]   - Call the Ab [address] at [phone].    - Call the [facility] Mobile Crisis Team at [phone]

## 2023-01-20 DIAGNOSIS — E55.9 VITAMIN D DEFICIENCY: ICD-10-CM

## 2023-01-23 RX ORDER — CHOLECALCIFEROL (VITAMIN D3) 125 MCG
1 CAPSULE ORAL DAILY
Qty: 90 TABLET | Refills: 1 | Status: SHIPPED | OUTPATIENT
Start: 2023-01-23

## 2023-01-23 NOTE — TELEPHONE ENCOUNTER
Medication:   Requested Prescriptions     Pending Prescriptions Disp Refills    Cholecalciferol (VITAMIN D3) 50 MCG (2000 UT) TABS 90 tablet 1     Sig: Take 1 tablet by mouth daily        Last Filled:      Patient Phone Number: [phone] (home)     Last appt: 10/3/2022   Next appt: Visit date not found    Last OARRS: No flowsheet data found.

## 2023-01-27 DIAGNOSIS — M79.89 LEFT LEG SWELLING: Primary | ICD-10-CM

## 2023-02-08 ENCOUNTER — TELEPHONE (OUTPATIENT)
Dept: VASCULAR SURGERY | Age: 23
End: 2023-02-08

## 2023-04-05 ENCOUNTER — OFFICE VISIT (OUTPATIENT)
Dept: PSYCHIATRY | Age: 23
End: 2023-04-05
Payer: COMMERCIAL

## 2023-04-05 VITALS
BODY MASS INDEX: 32.54 KG/M2 | HEART RATE: 93 BPM | DIASTOLIC BLOOD PRESSURE: 60 MMHG | WEIGHT: 176.8 LBS | SYSTOLIC BLOOD PRESSURE: 110 MMHG | HEIGHT: 62 IN

## 2023-04-05 DIAGNOSIS — F33.1 MODERATE EPISODE OF RECURRENT MAJOR DEPRESSIVE DISORDER (HCC): ICD-10-CM

## 2023-04-05 DIAGNOSIS — F41.1 GAD (GENERALIZED ANXIETY DISORDER): Primary | ICD-10-CM

## 2023-04-05 PROCEDURE — 1036F TOBACCO NON-USER: CPT | Performed by: REGISTERED NURSE

## 2023-04-05 PROCEDURE — G8427 DOCREV CUR MEDS BY ELIG CLIN: HCPCS | Performed by: REGISTERED NURSE

## 2023-04-05 PROCEDURE — G8417 CALC BMI ABV UP PARAM F/U: HCPCS | Performed by: REGISTERED NURSE

## 2023-04-05 PROCEDURE — 99213 OFFICE O/P EST LOW 20 MIN: CPT | Performed by: REGISTERED NURSE

## 2023-04-05 RX ORDER — BUPROPION HYDROCHLORIDE 150 MG/1
150 TABLET ORAL EVERY MORNING
Qty: 30 TABLET | Refills: 2 | Status: SHIPPED | OUTPATIENT
Start: 2023-04-05

## 2023-04-05 NOTE — PATIENT INSTRUCTIONS
[facility] Mj, [address] [facility] [address]   Phone: [phone]    [facility]. Address: [facility]   Phone: [phone]    [facility]. Address: [address] [place] [address]    Phone: [phone]    [facility]. Address [address] [facility] [address], Phone. [address] Psychiatry services: phone [phone]. Patients: please, call your insurance company to get the full lists of behavioral health counselling providers in your area.

## 2023-04-05 NOTE — PROGRESS NOTES
Readings from Last 3 Encounters:   04/05/23 93   01/04/23 88   10/05/22 54       Aims: 0  Labs: Up to date    Mental Status Exam:   Appearance    alert, cooperative  Motor: Normal strength and tone, No abnormal movements, tics or mannerisms. Speech    spontaneous, normal rate, normal volume, and well articulated  Mood/Affect    Anxious / anxiety  Thought Process    linear, goal directed, and coherent  Thought Content    intact , no suicidal ideation  Associations    logical connections  Attention/Concentration    intact  Memory    recent and remote memory intact  Insight/Judgement    Good / Intact    Safety: 911, 988, PES, hotlines, and interventions discussed today. Risk factors:  Anxiety, depression d/o, age < 22 y.o , school and work stress  Protective factors: Denies current or recent active suicidal ideation, does not have lethal plan, patient is tom for safety, patient has social or family support, no active psychosis or cognitive dysfunction, physically healthy, compliant with recommended medications, and patient is future-oriented. Total time spent on this encounter: 23 minutes     Thank you for consult. Please do not hesitate to contact provider if there are additional questions regarding patient.      Zack Smart DNP, PMHNP-BC, CNP   [facility]            04/05/23

## 2023-04-26 ENCOUNTER — OFFICE VISIT (OUTPATIENT)
Dept: PSYCHIATRY | Age: 23
End: 2023-04-26
Payer: COMMERCIAL

## 2023-04-26 VITALS
HEART RATE: 84 BPM | DIASTOLIC BLOOD PRESSURE: 56 MMHG | WEIGHT: 181.2 LBS | BODY MASS INDEX: 33.34 KG/M2 | HEIGHT: 62 IN | SYSTOLIC BLOOD PRESSURE: 108 MMHG

## 2023-04-26 DIAGNOSIS — F41.1 GAD (GENERALIZED ANXIETY DISORDER): ICD-10-CM

## 2023-04-26 DIAGNOSIS — F33.0 MILD EPISODE OF RECURRENT MAJOR DEPRESSIVE DISORDER (HCC): ICD-10-CM

## 2023-04-26 DIAGNOSIS — F41.0 PANIC DISORDER: Primary | ICD-10-CM

## 2023-04-26 PROCEDURE — G8427 DOCREV CUR MEDS BY ELIG CLIN: HCPCS | Performed by: REGISTERED NURSE

## 2023-04-26 PROCEDURE — G8417 CALC BMI ABV UP PARAM F/U: HCPCS | Performed by: REGISTERED NURSE

## 2023-04-26 PROCEDURE — 1036F TOBACCO NON-USER: CPT | Performed by: REGISTERED NURSE

## 2023-04-26 PROCEDURE — 99213 OFFICE O/P EST LOW 20 MIN: CPT | Performed by: REGISTERED NURSE

## 2023-04-26 ASSESSMENT — ANXIETY QUESTIONNAIRES
5. BEING SO RESTLESS THAT IT IS HARD TO SIT STILL: 0
6. BECOMING EASILY ANNOYED OR IRRITABLE: 2
GAD7 TOTAL SCORE: 11
4. TROUBLE RELAXING: 3
1. FEELING NERVOUS, ANXIOUS, OR ON EDGE: 2
2. NOT BEING ABLE TO STOP OR CONTROL WORRYING: 1
7. FEELING AFRAID AS IF SOMETHING AWFUL MIGHT HAPPEN: 0
3. WORRYING TOO MUCH ABOUT DIFFERENT THINGS: 3

## 2023-04-26 ASSESSMENT — PATIENT HEALTH QUESTIONNAIRE - PHQ9
1. LITTLE INTEREST OR PLEASURE IN DOING THINGS: 0
2. FEELING DOWN, DEPRESSED OR HOPELESS: 1
9. THOUGHTS THAT YOU WOULD BE BETTER OFF DEAD, OR OF HURTING YOURSELF: 0
SUM OF ALL RESPONSES TO PHQ QUESTIONS 1-9: 10
SUM OF ALL RESPONSES TO PHQ QUESTIONS 1-9: 10
8. MOVING OR SPEAKING SO SLOWLY THAT OTHER PEOPLE COULD HAVE NOTICED. OR THE OPPOSITE, BEING SO FIGETY OR RESTLESS THAT YOU HAVE BEEN MOVING AROUND A LOT MORE THAN USUAL: 1
5. POOR APPETITE OR OVEREATING: 2
4. FEELING TIRED OR HAVING LITTLE ENERGY: 3
6. FEELING BAD ABOUT YOURSELF - OR THAT YOU ARE A FAILURE OR HAVE LET YOURSELF OR YOUR FAMILY DOWN: 1
7. TROUBLE CONCENTRATING ON THINGS, SUCH AS READING THE NEWSPAPER OR WATCHING TELEVISION: 0
SUM OF ALL RESPONSES TO PHQ9 QUESTIONS 1 & 2: 1
3. TROUBLE FALLING OR STAYING ASLEEP: 2
SUM OF ALL RESPONSES TO PHQ QUESTIONS 1-9: 10
SUM OF ALL RESPONSES TO PHQ QUESTIONS 1-9: 10

## 2023-04-26 NOTE — PATIENT INSTRUCTIONS
Here are some of Psychiatric Emergency resources for you:     National suicide hotlines: 97 203066, 1-862-453-TALK (9-638.358.5181) and 5-404-UDCBRZS (3-680.850.5267). 2.  Call 911 or go to any nearest emergency room   3. Access the [facility] Emergency Psychiatry Services:     - Go to the [facility] Psychiatric Emergency Services (PES) at [address], [address]   - Call the [address] at [phone].    - Call the [facility] Mobile Crisis Team at [address] drugs: This class of drugs can cause sedation, blurred vision, dry-mouth, constipation, postural hypotension, urinary retention, tachycardia, muscle tremors, agitation, headache, skin rash, photo sensitivity, excessive weight gain, glaucoma, heart disease, lowering seizure threshold, increase risk of suicidal thinking or ideations, excessive sweating, sextual dysfunction, insomnia, anxiety, bruxism, GI bleeding, pregnancy complications and birth defects, possible death, etc.      Malik. Address: [address], Anitha [address], Phone: [phone] [facility] and Psychological Services: Address: [address] [facility] [place], [place], Phone: [phone]    [facility]/ Former[facility]. Address: [address] [facility] [place], [place], Phone: [phone]    [address] [place] [address]    Address: [address] [facility] M[address] [facility]    Phone: [phone]     M[facility] Access Point    Address: [facility] [address] [facility] [address], [address]   Phone: [phone]     [facility].   Address: D[facility] Mj, [address]   Phone: [phone]    [facility].    Address: [facility], [address] [facility]   Phone: [phone]    [address] [place] [address]

## 2023-04-26 NOTE — PROGRESS NOTES
PSYCHIATRY OUT PATIENT FOLLOW UP    Patient name: Yanet Perkins  : 2000  Date of service: 23  PCP: NATALIYA Davis    Dx:  1. Panic disorder  2. MARLENA (generalized anxiety disorder)  3. Mild episode of recurrent major depressive disorder (HCC)    Assessment and plan    Psychiatric   -Continue BuSpar 2.5 mg every morning and 5 mg at bedtime  -Continue hydroxyzine 25 mg 3 times daily for anxiety  -Start Wellbutrin  mg every morning  -Medication R/B/SE discussed and patient gave verbal consent for tx.  -Practice complementary health approaches such as: self-management strategies, relaxation techniques, yoga, and physical exercise as tolerated. 2. Psychotherapy  - continue outpatient therapy as scheduled. She attends once weekly with Deena Kidd at [facility] Therapy services. - advised relaxation techniques and resources discussed. - discussed CBT- cognitive restructuring  3. Substance   - no reported substance abuse   4. Medical   - Follow with PCP  5. RTC in 2 months or earlier if your symptoms fail to improve or go to nearest ER if having active SI/HI. Evaluated medications and assessed for side effects and effectiveness. Assessed patient's educational needs including reviewing plan of care, medications,diagnosis, treatment options, and prognosis. I reviewed the plan of care with the patient and the patient consented to the treatment interventions. Patient acknowledged, verbalized understanding, and agreed with plan of care. Interval progress: The patient is here for follow up. She feels tired . She appears a bit stressed but managing as much she can. She took one final exam this morning. She did not start Wellbutrin XL yet as she just finished fasting a week a go. She is planning to start in few week. She has been taking Buspar twice a day. She denies any side effects to Buspar. She reports having blood in stool- advised to seek advice from PCP.  She is working hard to finish this

## 2023-06-05 RX ORDER — BUSPIRONE HYDROCHLORIDE 5 MG/1
5 TABLET ORAL 2 TIMES DAILY
Qty: 180 TABLET | Refills: 0 | Status: SHIPPED | OUTPATIENT
Start: 2023-06-05 | End: 2023-07-05

## 2023-06-05 RX ORDER — BUSPIRONE HYDROCHLORIDE 5 MG/1
5 TABLET ORAL 2 TIMES DAILY
Qty: 180 TABLET | Refills: 0 | Status: SHIPPED | OUTPATIENT
Start: 2023-06-05 | End: 2023-06-05 | Stop reason: SDUPTHER

## 2023-07-05 ENCOUNTER — OFFICE VISIT (OUTPATIENT)
Dept: PSYCHIATRY | Age: 23
End: 2023-07-05
Payer: COMMERCIAL

## 2023-07-05 VITALS
SYSTOLIC BLOOD PRESSURE: 122 MMHG | DIASTOLIC BLOOD PRESSURE: 68 MMHG | WEIGHT: 185.8 LBS | HEIGHT: 62 IN | HEART RATE: 102 BPM | BODY MASS INDEX: 34.19 KG/M2

## 2023-07-05 DIAGNOSIS — E55.9 VITAMIN D DEFICIENCY: ICD-10-CM

## 2023-07-05 DIAGNOSIS — F41.0 PANIC DISORDER: ICD-10-CM

## 2023-07-05 DIAGNOSIS — F41.1 GAD (GENERALIZED ANXIETY DISORDER): Primary | ICD-10-CM

## 2023-07-05 PROCEDURE — G8427 DOCREV CUR MEDS BY ELIG CLIN: HCPCS | Performed by: REGISTERED NURSE

## 2023-07-05 PROCEDURE — 1036F TOBACCO NON-USER: CPT | Performed by: REGISTERED NURSE

## 2023-07-05 PROCEDURE — G8417 CALC BMI ABV UP PARAM F/U: HCPCS | Performed by: REGISTERED NURSE

## 2023-07-05 PROCEDURE — 99212 OFFICE O/P EST SF 10 MIN: CPT | Performed by: REGISTERED NURSE

## 2023-07-05 RX ORDER — CHOLECALCIFEROL (VITAMIN D3) 125 MCG
1 CAPSULE ORAL DAILY
Qty: 90 TABLET | Refills: 1 | Status: SHIPPED | OUTPATIENT
Start: 2023-07-05

## 2023-07-05 RX ORDER — BUSPIRONE HYDROCHLORIDE 5 MG/1
5 TABLET ORAL 2 TIMES DAILY
Qty: 180 TABLET | Refills: 0 | Status: CANCELLED | OUTPATIENT
Start: 2023-07-05 | End: 2023-10-03

## 2023-07-05 RX ORDER — BUSPIRONE HYDROCHLORIDE 5 MG/1
5 TABLET ORAL 2 TIMES DAILY
Qty: 180 TABLET | Refills: 0 | Status: SHIPPED | OUTPATIENT
Start: 2023-07-05 | End: 2023-10-03

## 2023-07-05 NOTE — PROGRESS NOTES
PSYCHIATRY OUT PATIENT FOLLOW UP    Patient name: Maximilian Markham  : 2000  Date of service: 23  PCP: NATALIYA Reyes    Dx:  1. MARLENA (generalized anxiety disorder)  2. Panic disorder    Assessment and plan    Psychiatric   -Continue BuSpar 5 mg BID  -Continue hydroxyzine 25 mg 3 times daily for anxiety  -D/c Wellbutrin  mg/daily   -Medication R/B/SE discussed and patient gave verbal consent for tx.  -Practice complementary health approaches such as: self-management strategies, relaxation techniques, yoga, and physical exercise as tolerated. 2. Psychotherapy  - continue outpatient therapy as scheduled. She attends once weekly with Abby Mckenzie at [facility] Therapy services. - advised relaxation techniques and resources discussed. - discussed CBT- cognitive restructuring  3. Substance   - no reported substance abuse   4. Medical   - Follow with PCP  5. RTC in 3 months or earlier if your symptoms fail to improve or go to nearest ER if having active SI/HI. Evaluated medications and assessed for side effects and effectiveness. Assessed patient's educational needs including reviewing plan of care, medications,diagnosis, treatment options, and prognosis. I reviewed the plan of care with the patient and the patient consented to the treatment interventions. Patient acknowledged, verbalized understanding, and agreed with plan of care. Interval progress:     23: The patient is here for follow up. She states she does not have depressive symptoms. She is busy finishing school. She will graduate on 2023. She is still taking four courses and works as well. She quit one of her job this week and will start another tomorrow. 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 She is more eager to get back to [address] [place] [address]. She denies depressed mood but feels irritable at times. She has been taking Buspar 5 mg BID which manages anxiety.  Denies any change with appetite or

## 2023-07-05 NOTE — PATIENT INSTRUCTIONS
Here are some of Psychiatric Emergency resources for you:     National suicide hotlines: 97 737114, 2-053-766-TALK (4-303.231.8369) and 7-985-FDMASMT (9-802.335.2110). 2.  Call 911 or go to any nearest emergency room   3.    Access the [facility] Emergency Psychiatry Services:     - Go to the [facility] Psychiatric Emergency Services (PES) at [address] [facility] [address] [facility], [address] [place]   - Call the [address] at [phone].    - Call the [facility] Mobile Crisis Team at [phone]

## 2023-09-05 DIAGNOSIS — E55.9 VITAMIN D DEFICIENCY: ICD-10-CM

## 2023-09-06 RX ORDER — CHOLECALCIFEROL (VITAMIN D3) 125 MCG
1 CAPSULE ORAL DAILY
Qty: 90 TABLET | Refills: 1 | OUTPATIENT
Start: 2023-09-06

## 2023-09-06 RX ORDER — CHOLECALCIFEROL (VITAMIN D3) 125 MCG
1 CAPSULE ORAL DAILY
Qty: 30 TABLET | Refills: 0 | Status: SHIPPED | OUTPATIENT
Start: 2023-09-06

## 2023-09-13 DIAGNOSIS — E55.9 VITAMIN D DEFICIENCY: ICD-10-CM

## 2023-09-14 RX ORDER — CHOLECALCIFEROL (VITAMIN D3) 125 MCG
1 CAPSULE ORAL DAILY
Qty: 30 TABLET | Refills: 0 | OUTPATIENT
Start: 2023-09-14

## 2023-09-14 RX ORDER — BUSPIRONE HYDROCHLORIDE 5 MG/1
5 TABLET ORAL 2 TIMES DAILY
Qty: 180 TABLET | Refills: 0 | Status: SHIPPED | OUTPATIENT
Start: 2023-09-14 | End: 2023-10-14 | Stop reason: SDUPTHER

## 2023-10-14 DIAGNOSIS — E55.9 VITAMIN D DEFICIENCY: ICD-10-CM

## 2023-10-16 RX ORDER — HYDROXYZINE HYDROCHLORIDE 25 MG/1
25 TABLET, FILM COATED ORAL EVERY 8 HOURS PRN
Qty: 90 TABLET | Refills: 0 | Status: SHIPPED | OUTPATIENT
Start: 2023-10-16 | End: 2023-11-15

## 2023-10-16 RX ORDER — BUSPIRONE HYDROCHLORIDE 5 MG/1
5 TABLET ORAL 2 TIMES DAILY
Qty: 180 TABLET | Refills: 0 | Status: SHIPPED | OUTPATIENT
Start: 2023-10-16 | End: 2023-12-14 | Stop reason: SDUPTHER

## 2023-10-17 RX ORDER — CHOLECALCIFEROL (VITAMIN D3) 125 MCG
1 CAPSULE ORAL DAILY
Qty: 30 TABLET | Refills: 0 | OUTPATIENT
Start: 2023-10-17

## 2023-11-21 ENCOUNTER — TELEPHONE (OUTPATIENT)
Dept: INTERNAL MEDICINE CLINIC | Age: 23
End: 2023-11-21

## 2023-11-21 NOTE — TELEPHONE ENCOUNTER
----- Message from MAMIE sent at 11/20/2023  3:30 PM EST -----  Subject: Message to Provider    QUESTIONS  Information for Provider? Patient is requesting a call back to schedule   her appointment with psychologist at [address] [place] [address].  ---------------------------------------------------------------------------  --------------  Marline Butler Sascha  [phone]; OK to leave message on [place],OK to respond with electronic   message via [employer] portal (only for patients who have registered [employer]   account)  ---------------------------------------------------------------------------  --------------  SCRIPT ANSWERS  Relationship to Patient?  Self

## 2023-12-14 DIAGNOSIS — E55.9 VITAMIN D DEFICIENCY: ICD-10-CM

## 2023-12-15 RX ORDER — CHOLECALCIFEROL (VITAMIN D3) 125 MCG
1 CAPSULE ORAL DAILY
Qty: 30 TABLET | Refills: 0 | OUTPATIENT
Start: 2023-12-15

## 2023-12-15 RX ORDER — BUSPIRONE HYDROCHLORIDE 5 MG/1
5 TABLET ORAL 2 TIMES DAILY
Qty: 180 TABLET | Refills: 0 | Status: SHIPPED | OUTPATIENT
Start: 2023-12-15 | End: 2024-03-14

## 2024-02-16 DIAGNOSIS — E55.9 VITAMIN D DEFICIENCY: ICD-10-CM

## 2024-02-16 RX ORDER — CHOLECALCIFEROL (VITAMIN D3) 125 MCG
1 CAPSULE ORAL DAILY
Qty: 30 TABLET | Refills: 0 | OUTPATIENT
Start: 2024-02-16

## 2024-02-16 RX ORDER — BUSPIRONE HYDROCHLORIDE 5 MG/1
5 TABLET ORAL 2 TIMES DAILY
Qty: 60 TABLET | Refills: 0 | Status: SHIPPED | OUTPATIENT
Start: 2024-02-16 | End: 2024-03-17

## 2024-02-16 NOTE — TELEPHONE ENCOUNTER
Medication:   Requested Prescriptions     Pending Prescriptions Disp Refills    Cholecalciferol (VITAMIN D3) 50 MCG (2000 UT) TABS 30 tablet 0     Sig: Take 1 tablet by mouth daily        Last Filled: 9/6/23     Last appt: 10/3/2022   Next appt: Visit date not found    Last OARRS:        No data to display

## 2024-03-08 RX ORDER — BUSPIRONE HYDROCHLORIDE 5 MG/1
5 TABLET ORAL 2 TIMES DAILY
Qty: 60 TABLET | Refills: 0 | Status: SHIPPED | OUTPATIENT
Start: 2024-03-08 | End: 2024-04-07

## 2024-05-10 RX ORDER — BUSPIRONE HYDROCHLORIDE 5 MG/1
5 TABLET ORAL 2 TIMES DAILY
Qty: 180 TABLET | Refills: 0 | OUTPATIENT
Start: 2024-05-10

## 2024-05-10 RX ORDER — BUSPIRONE HYDROCHLORIDE 5 MG/1
5 TABLET ORAL 2 TIMES DAILY
Qty: 120 TABLET | Refills: 0 | Status: SHIPPED | OUTPATIENT
Start: 2024-05-10 | End: 2024-07-09

## 2024-07-08 ENCOUNTER — TELEPHONE (OUTPATIENT)
Dept: INTERNAL MEDICINE CLINIC | Age: 24
End: 2024-07-08

## 2024-07-08 RX ORDER — BUSPIRONE HYDROCHLORIDE 5 MG/1
5 TABLET ORAL 2 TIMES DAILY
Qty: 30 TABLET | Refills: 0 | Status: SHIPPED | OUTPATIENT
Start: 2024-07-08 | End: 2024-07-23

## 2024-07-08 NOTE — TELEPHONE ENCOUNTER
Pt called  in to get medication refilled   No refills  in the system   Pt does not have any more medication  and do have a appointment scheduled on 7/24

## 2024-07-08 NOTE — TELEPHONE ENCOUNTER
Medication:   Requested Prescriptions     Pending Prescriptions Disp Refills    busPIRone (BUSPAR) 5 MG tablet 120 tablet 0     Sig: Take 1 tablet by mouth 2 times daily        Last Filled:      Patient Phone Number: [phone] (home)     Last appt: 12/18/2023   Next appt: 7/24/2024    Last OARRS:        No data to display

## 2024-07-24 ENCOUNTER — OFFICE VISIT (OUTPATIENT)
Dept: INTERNAL MEDICINE CLINIC | Age: 24
End: 2024-07-24
Payer: COMMERCIAL

## 2024-07-24 ENCOUNTER — OFFICE VISIT (OUTPATIENT)
Dept: PSYCHIATRY | Age: 24
End: 2024-07-24
Payer: COMMERCIAL

## 2024-07-24 VITALS
BODY MASS INDEX: 33.9 KG/M2 | WEIGHT: 184.2 LBS | TEMPERATURE: 97.1 F | HEIGHT: 62 IN | DIASTOLIC BLOOD PRESSURE: 70 MMHG | OXYGEN SATURATION: 96 % | SYSTOLIC BLOOD PRESSURE: 120 MMHG | HEART RATE: 78 BPM

## 2024-07-24 DIAGNOSIS — R20.0 NUMBNESS AND TINGLING OF BOTH LOWER EXTREMITIES: ICD-10-CM

## 2024-07-24 DIAGNOSIS — Z00.00 ENCOUNTER FOR WELL ADULT EXAM WITHOUT ABNORMAL FINDINGS: Primary | ICD-10-CM

## 2024-07-24 DIAGNOSIS — E55.9 VITAMIN D DEFICIENCY: ICD-10-CM

## 2024-07-24 DIAGNOSIS — R20.2 NUMBNESS AND TINGLING OF BOTH LOWER EXTREMITIES: ICD-10-CM

## 2024-07-24 DIAGNOSIS — E66.9 CLASS 1 OBESITY IN ADULT, UNSPECIFIED BMI, UNSPECIFIED OBESITY TYPE, UNSPECIFIED WHETHER SERIOUS COMORBIDITY PRESENT: ICD-10-CM

## 2024-07-24 DIAGNOSIS — D64.9 LOW HEMOGLOBIN: ICD-10-CM

## 2024-07-24 DIAGNOSIS — L40.9 PSORIASIS: ICD-10-CM

## 2024-07-24 DIAGNOSIS — F41.0 PANIC DISORDER: Primary | ICD-10-CM

## 2024-07-24 DIAGNOSIS — Z00.00 ENCOUNTER FOR WELL ADULT EXAM WITHOUT ABNORMAL FINDINGS: ICD-10-CM

## 2024-07-24 DIAGNOSIS — F41.1 GAD (GENERALIZED ANXIETY DISORDER): ICD-10-CM

## 2024-07-24 LAB
BILIRUB UR QL STRIP.AUTO: NEGATIVE
CLARITY UR: ABNORMAL
COLOR UR: YELLOW
GLUCOSE UR STRIP.AUTO-MCNC: NEGATIVE MG/DL
HGB UR QL STRIP.AUTO: NEGATIVE
KETONES UR STRIP.AUTO-MCNC: ABNORMAL MG/DL
LEUKOCYTE ESTERASE UR QL STRIP.AUTO: ABNORMAL
NITRITE UR QL STRIP.AUTO: NEGATIVE
PH UR STRIP.AUTO: 5.5 [PH] (ref 5–8)
PROT UR STRIP.AUTO-MCNC: ABNORMAL MG/DL
SP GR UR STRIP.AUTO: 1.03 (ref 1–1.03)
UA DIPSTICK W REFLEX MICRO PNL UR: YES
URN SPEC COLLECT METH UR: ABNORMAL
UROBILINOGEN UR STRIP-ACNC: 0.2 E.U./DL

## 2024-07-24 PROCEDURE — G8427 DOCREV CUR MEDS BY ELIG CLIN: HCPCS | Performed by: REGISTERED NURSE

## 2024-07-24 PROCEDURE — 99395 PREV VISIT EST AGE 18-39: CPT | Performed by: NURSE PRACTITIONER

## 2024-07-24 PROCEDURE — 1036F TOBACCO NON-USER: CPT | Performed by: REGISTERED NURSE

## 2024-07-24 PROCEDURE — G8417 CALC BMI ABV UP PARAM F/U: HCPCS | Performed by: REGISTERED NURSE

## 2024-07-24 PROCEDURE — 99212 OFFICE O/P EST SF 10 MIN: CPT | Performed by: REGISTERED NURSE

## 2024-07-24 RX ORDER — BUSPIRONE HYDROCHLORIDE 5 MG/1
5 TABLET ORAL 3 TIMES DAILY
COMMUNITY
Start: 2022-10-05 | End: 2024-07-24

## 2024-07-24 RX ORDER — HYDROXYZINE PAMOATE 25 MG/1
25 CAPSULE ORAL 3 TIMES DAILY PRN
Qty: 90 CAPSULE | Refills: 0 | Status: SHIPPED | OUTPATIENT
Start: 2024-07-24 | End: 2024-08-23

## 2024-07-24 RX ORDER — CHOLECALCIFEROL (VITAMIN D3) 125 MCG
1 CAPSULE ORAL DAILY
Qty: 30 TABLET | Refills: 11 | Status: SHIPPED | OUTPATIENT
Start: 2024-07-24

## 2024-07-24 RX ORDER — BUSPIRONE HYDROCHLORIDE 5 MG/1
5 TABLET ORAL 2 TIMES DAILY
Qty: 180 TABLET | Refills: 0 | Status: SHIPPED | OUTPATIENT
Start: 2024-07-24 | End: 2024-10-22

## 2024-07-24 ASSESSMENT — ENCOUNTER SYMPTOMS
GASTROINTESTINAL NEGATIVE: 1
RESPIRATORY NEGATIVE: 1

## 2024-07-24 NOTE — PATIENT INSTRUCTIONS
Here are some of Psychiatric Emergency resources for you:     National suicide hotlines: 988, 9-942-279-TALK (1-916.266.6736) and 4-139-BVLWBET (1-189.332.9774).   2.  Call 911 or go to any nearest emergency room   3.   Access the [facility] Emergency Psychiatry Services:     - Go to the  Psychiatric Emergency Services (PES) at [address] [address]   - Call the  PES at [phone].    - Call the  Mobile Crisis Team at [phone]     Anti-anxiety drugs: Sedation, morning hangover, ataxia, nausea, respiratory depression, decrease cognitive function, light-headiness, withdrawal effects, anterograde amnesia, possible death, etc.

## 2024-07-24 NOTE — PROGRESS NOTES
Results   Component Value Date/Time    TSH 0.98 08/03/2022 12:01 PM           7/24/2024     3:06 PM 12/18/2023    11:00 AM 4/26/2023    10:25 AM 8/24/2022     9:48 AM   MARLENA 7 SCORE   MARLENA-7 Total Score 12 4 11 16     Interpretation of MARLENA-7 score: 5-9 = mild anxiety, 10-14 = moderate anxiety,   15+ = severe anxiety. Recommend referral to behavioral health for scores 10 or greater.        7/24/2024     3:06 PM 2/19/2024    11:26 AM 12/18/2023    11:28 AM 4/26/2023    10:25 AM 8/24/2022     9:47 AM 8/3/2022    10:31 AM   PHQ Scores   PHQ2 Score 0 0 1 1 1 6   PHQ9 Score 6 0 2 10 10 17       Interpretation of PHQ-9 score:  1-4 = minimal depression, 5-9 = mild depression,   10-14 = moderate depression; 15-19 = moderately severe depression, 20-27 = severe depression    Aims: 0  Labs: Up to date    Mental Status Exam:   Appearance    alert, cooperative, crying  Motor: Normal strength and tone, No abnormal movements, tics or mannerisms.  Speech    spontaneous  Mood/Affect    Anxious / anxiety  Thought Process    linear and goal directed  Thought Content    intact , no suicidal ideation  Associations    logical connections  Attention/Concentration    intact  Memory    recent and remote memory intact  Insight/Judgement    Fair / Intact    Safety: 911, 988, PES, hotlines, and interventions discussed today.   Risk factors:  Anxiety, depression d/o, age < 25 y.o   Protective factors: Denies current or recent active suicidal ideation, does not have lethal plan, patient is tom for safety, patient has social or family support, no active psychosis or cognitive dysfunction, physically healthy, compliant with recommended medications, and patient is future-oriented.    I have spent >18 min with this patient on working on coping skills and med mgt, and > 1/2 of that time was spent counseling and providing education regarding diagnosis, treatment options, and prognosis.     Thank you for consult. Please do not hesitate to contact

## 2024-07-24 NOTE — PATIENT INSTRUCTIONS
screening for diabetes; hepatitis C; HIV; and cervical, breast, lung, and colon cancer. Screening helps find diseases before any symptoms appear.   Eat healthy foods. Choose fruits, vegetables, whole grains, lean protein, and low-fat dairy foods. Limit saturated fat and reduce salt.     Limit alcohol. Men should have no more than 2 drinks a day. Women should have no more than 1. For some people, no alcohol is the best choice.   Exercise. Get at least 30 minutes of exercise on most days of the week. Walking can be a good choice.     Reach and stay at your healthy weight. This will lower your risk for many health problems.   Take care of your mental health. Try to stay connected with friends, family, and community, and find ways to manage stress.     If you're feeling depressed or hopeless, talk to someone. A counselor can help. If you don't have a counselor, talk to your doctor.   Talk to your doctor if you think you may have a problem with alcohol or drug use. This includes prescription medicines, marijuana, and other drugs.     Avoid tobacco and nicotine: Don't smoke, vape, or chew. If you need help quitting, talk to your doctor.   Practice safer sex. Getting tested, using condoms or dental dams, and limiting sex partners can help prevent STIs.     Use birth control if it's important to you to prevent pregnancy. Talk with your doctor about your choices and what might be best for you.   Prevent problems where you can. Protect your skin from too much sun, wash your hands, brush your teeth twice a day, and wear a seat belt in the car.   Where can you learn more?  Go to [email or web address].net/patientEd and enter P072 to learn more about \"Well Visit, Ages 18 to 65: Care Instructions.\"  Current as of: August 6, 2023  Content Version: 14.1  © 6432-1293 [employer].   Care instructions adapted under license by [employer]. If you have questions about a medical condition or this instruction, always ask

## 2024-07-24 NOTE — PROGRESS NOTES
Well Adult Note  Name: Nereida Llanos Today’s Date: 2024   MRN: [id number] Sex: Female   Age: 23 y.o. Ethnicity: Non- / Non    : 2000 Race: Unavailable      Nereida Llanos is here for a well adult exam.       Subjective   History:  Established patient.    Review of Systems   Constitutional:         Obesity-requesting to have cortisol levels checked   HENT: Negative.     Respiratory: Negative.     Cardiovascular: Negative.    Gastrointestinal: Negative.    Endocrine:        Vitamin D deficiency.  Has been out of medication.  Complaining of cold feet and tingling mainly when sitting down   Genitourinary: Negative.    Musculoskeletal: Negative.    Skin: Negative.    Neurological: Negative.    Hematological:         History of low hemoglobin.  Needs iron study.   Psychiatric/Behavioral:  Positive for decreased concentration, dysphoric mood and sleep disturbance. Negative for self-injury and suicidal ideas. The patient is nervous/anxious.        Allergies   Allergen Reactions    Latex     Gelatin Other (See Comments)     Restorationist reasons  Pork restriction      Gelatin A      Restorationist reasons    Lactose Other (See Comments)    Zinc Gelatin [Zinc]      Prior to Visit Medications    Medication Sig Taking? Authorizing Provider   busPIRone (BUSPAR) 5 MG tablet Take 1 tablet by mouth 3 times daily Yes Provider, MD Ti   Cholecalciferol (VITAMIN D3) 50 MCG (2000) TABS Take 1 tablet by mouth daily Yes Shryea Leija APRN     Past Medical History:   Diagnosis Date    Anxiety     Depression      No past surgical history on file.  No family history on file.  Social History     Tobacco Use    Smoking status: Never     Passive exposure: Never    Smokeless tobacco: Never   Substance Use Topics    Alcohol use: Never    Drug use: Never           Objective   Vital Signs  /70 (Site: Left Upper Arm, Position: Sitting)   Pulse 78   Temp 97.1 °F (36.2 °C) (Temporal)   Ht 1.575 m (5' 2\")   Wt 83.6

## 2024-07-25 ENCOUNTER — E-VISIT (OUTPATIENT)
Dept: INTERNAL MEDICINE CLINIC | Age: 24
End: 2024-07-25
Payer: COMMERCIAL

## 2024-07-25 DIAGNOSIS — L21.9 SEBORRHEIC DERMATITIS: Primary | ICD-10-CM

## 2024-07-25 LAB
25(OH)D3 SERPL-MCNC: 23 NG/ML
ALBUMIN SERPL-MCNC: 4.3 G/DL (ref 3.4–5)
ALBUMIN/GLOB SERPL: 1.4 {RATIO} (ref 1.1–2.2)
ALP SERPL-CCNC: 47 U/L (ref 40–129)
ALT SERPL-CCNC: 12 U/L (ref 10–40)
AMORPH SED URNS QL MICRO: ABNORMAL /HPF
ANION GAP SERPL CALCULATED.3IONS-SCNC: 15 MMOL/L (ref 3–16)
AST SERPL-CCNC: 23 U/L (ref 15–37)
BACTERIA URNS QL MICRO: ABNORMAL /HPF
BASOPHILS # BLD: 0 K/UL (ref 0–0.2)
BASOPHILS NFR BLD: 0.4 %
BILIRUB SERPL-MCNC: <0.2 MG/DL (ref 0–1)
BUN SERPL-MCNC: 12 MG/DL (ref 7–20)
CALCIUM SERPL-MCNC: 9.5 MG/DL (ref 8.3–10.6)
CHLORIDE SERPL-SCNC: 105 MMOL/L (ref 99–110)
CHOLEST SERPL-MCNC: 194 MG/DL (ref 0–199)
CO2 SERPL-SCNC: 23 MMOL/L (ref 21–32)
CREAT SERPL-MCNC: 0.8 MG/DL (ref 0.6–1.1)
DEPRECATED RDW RBC AUTO: 15.1 % (ref 12.4–15.4)
EOSINOPHIL # BLD: 0.1 K/UL (ref 0–0.6)
EOSINOPHIL NFR BLD: 0.8 %
EPI CELLS #/AREA URNS HPF: ABNORMAL /HPF (ref 0–5)
ERYTHROCYTE [SEDIMENTATION RATE] IN BLOOD BY WESTERGREN METHOD: 26 MM/HR (ref 0–20)
EST. AVERAGE GLUCOSE BLD GHB EST-MCNC: 108.3 MG/DL
FERRITIN SERPL IA-MCNC: 7 NG/ML (ref 15–150)
FOLATE SERPL-MCNC: 9.07 NG/ML (ref 4.78–24.2)
GFR SERPLBLD CREATININE-BSD FMLA CKD-EPI: >90 ML/MIN/{1.73_M2}
GLUCOSE SERPL-MCNC: 90 MG/DL (ref 70–99)
HBA1C MFR BLD: 5.4 %
HCT VFR BLD AUTO: 33.1 % (ref 36–48)
HDLC SERPL-MCNC: 63 MG/DL (ref 40–60)
HGB BLD-MCNC: 10.6 G/DL (ref 12–16)
IRON SATN MFR SERPL: 9 % (ref 15–50)
IRON SERPL-MCNC: 34 UG/DL (ref 37–145)
LDLC SERPL CALC-MCNC: 122 MG/DL
LYMPHOCYTES # BLD: 2.2 K/UL (ref 1–5.1)
LYMPHOCYTES NFR BLD: 24.6 %
MCH RBC QN AUTO: 24.1 PG (ref 26–34)
MCHC RBC AUTO-ENTMCNC: 32.1 G/DL (ref 31–36)
MCV RBC AUTO: 75.2 FL (ref 80–100)
MONOCYTES # BLD: 0.5 K/UL (ref 0–1.3)
MONOCYTES NFR BLD: 5.5 %
NEUTROPHILS # BLD: 6.1 K/UL (ref 1.7–7.7)
NEUTROPHILS NFR BLD: 68.7 %
PLATELET # BLD AUTO: 310 K/UL (ref 135–450)
PMV BLD AUTO: 9.5 FL (ref 5–10.5)
POTASSIUM SERPL-SCNC: 4.1 MMOL/L (ref 3.5–5.1)
PROT SERPL-MCNC: 7.3 G/DL (ref 6.4–8.2)
RBC # BLD AUTO: 4.4 M/UL (ref 4–5.2)
RBC #/AREA URNS HPF: ABNORMAL /HPF (ref 0–4)
SODIUM SERPL-SCNC: 143 MMOL/L (ref 136–145)
TIBC SERPL-MCNC: 363 UG/DL (ref 260–445)
TRIGL SERPL-MCNC: 43 MG/DL (ref 0–150)
TSH SERPL DL<=0.005 MIU/L-ACNC: 0.86 UIU/ML (ref 0.27–4.2)
VIT B12 SERPL-MCNC: 545 PG/ML (ref 211–911)
VLDLC SERPL CALC-MCNC: 9 MG/DL
WBC # BLD AUTO: 8.9 K/UL (ref 4–11)
WBC #/AREA URNS HPF: ABNORMAL /HPF (ref 0–5)

## 2024-07-25 PROCEDURE — 99213 OFFICE O/P EST LOW 20 MIN: CPT | Performed by: NURSE PRACTITIONER

## 2024-07-25 RX ORDER — CICLOPIROX 1 G/100ML
SHAMPOO TOPICAL
Qty: 120 ML | Refills: 2 | Status: SHIPPED | OUTPATIENT
Start: 2024-07-25

## 2024-07-26 DIAGNOSIS — D50.9 IRON DEFICIENCY ANEMIA, UNSPECIFIED IRON DEFICIENCY ANEMIA TYPE: Primary | ICD-10-CM

## 2024-07-26 DIAGNOSIS — R82.71 BACTERIURIA: ICD-10-CM

## 2024-07-26 RX ORDER — FERROUS SULFATE 325(65) MG
325 TABLET ORAL
Qty: 90 TABLET | Refills: 1 | Status: SHIPPED | OUTPATIENT
Start: 2024-07-26

## 2024-07-28 LAB — CORTIS F SERPL-MCNC: 0.18 UG/DL

## 2024-10-28 RX ORDER — BUSPIRONE HYDROCHLORIDE 5 MG/1
5 TABLET ORAL 2 TIMES DAILY
Qty: 60 TABLET | Refills: 0 | Status: SHIPPED | OUTPATIENT
Start: 2024-10-28 | End: 2024-11-27

## 2024-10-28 RX ORDER — BUSPIRONE HYDROCHLORIDE 5 MG/1
5 TABLET ORAL 2 TIMES DAILY
Qty: 180 TABLET | Refills: 0 | OUTPATIENT
Start: 2024-10-28

## 2024-10-28 NOTE — TELEPHONE ENCOUNTER
Medication:   Requested Prescriptions     Pending Prescriptions Disp Refills    busPIRone (BUSPAR) 5 MG tablet [Pharmacy Med Name: BUSPIRONE 5MG TABLETS] 180 tablet 0     Sig: TAKE 1 TABLET BY MOUTH TWICE DAILY        Last Filled:      Patient Phone Number: [phone] (home)     Last appt: 7/24/2024   Next appt: 11/20/2024    Last OARRS:        No data to display

## 2024-10-29 RX ORDER — HYDROXYZINE HYDROCHLORIDE 25 MG/1
25 TABLET, FILM COATED ORAL 3 TIMES DAILY PRN
Qty: 90 TABLET | Refills: 0 | Status: SHIPPED | OUTPATIENT
Start: 2024-10-29 | End: 2024-11-28

## 2024-11-20 ENCOUNTER — OFFICE VISIT (OUTPATIENT)
Dept: PSYCHIATRY | Age: 24
End: 2024-11-20
Payer: COMMERCIAL

## 2024-11-20 DIAGNOSIS — F41.1 GAD (GENERALIZED ANXIETY DISORDER): Primary | ICD-10-CM

## 2024-11-20 DIAGNOSIS — F41.0 PANIC DISORDER: ICD-10-CM

## 2024-11-20 PROCEDURE — 1036F TOBACCO NON-USER: CPT | Performed by: REGISTERED NURSE

## 2024-11-20 PROCEDURE — G8417 CALC BMI ABV UP PARAM F/U: HCPCS | Performed by: REGISTERED NURSE

## 2024-11-20 PROCEDURE — G8428 CUR MEDS NOT DOCUMENT: HCPCS | Performed by: REGISTERED NURSE

## 2024-11-20 PROCEDURE — G8484 FLU IMMUNIZE NO ADMIN: HCPCS | Performed by: REGISTERED NURSE

## 2024-11-20 PROCEDURE — 99213 OFFICE O/P EST LOW 20 MIN: CPT | Performed by: REGISTERED NURSE

## 2024-11-20 RX ORDER — HYDROXYZINE HYDROCHLORIDE 25 MG/1
25 TABLET, FILM COATED ORAL 3 TIMES DAILY PRN
Qty: 270 TABLET | Refills: 0 | Status: SHIPPED | OUTPATIENT
Start: 2024-11-20 | End: 2025-02-18

## 2024-11-20 RX ORDER — BUSPIRONE HYDROCHLORIDE 5 MG/1
5 TABLET ORAL 2 TIMES DAILY
Qty: 180 TABLET | Refills: 1 | Status: SHIPPED | OUTPATIENT
Start: 2024-11-20 | End: 2025-02-18

## 2024-11-20 ASSESSMENT — PATIENT HEALTH QUESTIONNAIRE - PHQ9
2. FEELING DOWN, DEPRESSED OR HOPELESS: NOT AT ALL
SUM OF ALL RESPONSES TO PHQ9 QUESTIONS 1 & 2: 0
4. FEELING TIRED OR HAVING LITTLE ENERGY: SEVERAL DAYS
SUM OF ALL RESPONSES TO PHQ QUESTIONS 1-9: 5
8. MOVING OR SPEAKING SO SLOWLY THAT OTHER PEOPLE COULD HAVE NOTICED. OR THE OPPOSITE, BEING SO FIGETY OR RESTLESS THAT YOU HAVE BEEN MOVING AROUND A LOT MORE THAN USUAL: NOT AT ALL
5. POOR APPETITE OR OVEREATING: SEVERAL DAYS
1. LITTLE INTEREST OR PLEASURE IN DOING THINGS: NOT AT ALL
9. THOUGHTS THAT YOU WOULD BE BETTER OFF DEAD, OR OF HURTING YOURSELF: NOT AT ALL
SUM OF ALL RESPONSES TO PHQ QUESTIONS 1-9: 5
3. TROUBLE FALLING OR STAYING ASLEEP: NOT AT ALL
SUM OF ALL RESPONSES TO PHQ QUESTIONS 1-9: 5
7. TROUBLE CONCENTRATING ON THINGS, SUCH AS READING THE NEWSPAPER OR WATCHING TELEVISION: MORE THAN HALF THE DAYS
SUM OF ALL RESPONSES TO PHQ QUESTIONS 1-9: 5
6. FEELING BAD ABOUT YOURSELF - OR THAT YOU ARE A FAILURE OR HAVE LET YOURSELF OR YOUR FAMILY DOWN: SEVERAL DAYS

## 2024-11-20 ASSESSMENT — ANXIETY QUESTIONNAIRES
7. FEELING AFRAID AS IF SOMETHING AWFUL MIGHT HAPPEN: NOT AT ALL
4. TROUBLE RELAXING: SEVERAL DAYS
5. BEING SO RESTLESS THAT IT IS HARD TO SIT STILL: NOT AT ALL
1. FEELING NERVOUS, ANXIOUS, OR ON EDGE: MORE THAN HALF THE DAYS
2. NOT BEING ABLE TO STOP OR CONTROL WORRYING: NOT AT ALL
GAD7 TOTAL SCORE: 7
6. BECOMING EASILY ANNOYED OR IRRITABLE: NEARLY EVERY DAY
3. WORRYING TOO MUCH ABOUT DIFFERENT THINGS: SEVERAL DAYS

## 2024-11-20 NOTE — PROGRESS NOTES
TWO TIMES DAILY 180 tablet 1    hydrOXYzine HCl (ATARAX) 25 MG tablet Take 1 tablet by mouth 3 times daily as needed for Anxiety TAKE ONE TABLET BY MOUTH EVERY 8 HOURS AS NEEDED FOR ANXIETY 270 tablet 0    ferrous sulfate (IRON 325) 325 (65 Fe) MG tablet Take 1 tablet by mouth daily (with breakfast) 90 tablet 1    Ciclopirox 1 % SHAM Apply ~5 mL to wet hair; lather, and leave on hair and scalp for ~3 minutes; rinse. May use up to 10 mL for longer hair. Repeat twice weekly for 4 weeks; allow a minimum of 3 days between applications 120 mL 2    Cholecalciferol (VITAMIN D3) 50 MCG (2000 UT) TABS Take 1 tablet by mouth daily 30 tablet 11     No current facility-administered medications for this visit.       O:  Wt Readings from Last 3 Encounters:   07/24/24 83.6 kg (184 lb 3.2 oz)   07/05/23 84.3 kg (185 lb 12.8 oz)   04/26/23 82.2 kg (181 lb 3.2 oz)     Temp Readings from Last 3 Encounters:   07/24/24 97.1 °F (36.2 °C) (Temporal)   08/03/22 98.6 °F (37 °C) (Temporal)     BP Readings from Last 3 Encounters:   07/24/24 120/70   07/05/23 122/68   04/26/23 (!) 108/56     Pulse Readings from Last 3 Encounters:   07/24/24 78   07/05/23 (!) 102   04/26/23 84     Lab Results   Component Value Date/Time    LABA1C 5.4 07/24/2024 03:53 PM     Cholesterol, Total (mg/dL)   Date Value   07/24/2024 194      No components found for: \"TSHREFLEX\"  Lab Results   Component Value Date/Time    TSH 0.86 07/24/2024 03:51 PM    TSH 0.98 08/03/2022 12:01 PM           11/20/2024     9:55 AM 7/24/2024     3:06 PM 12/18/2023    11:00 AM 4/26/2023    10:25 AM 8/24/2022     9:48 AM   MARLENA 7 SCORE   MARLENA-7 Total Score 7 12 4 11 16     Interpretation of MARLENA-7 score: 5-9 = mild anxiety, 10-14 = moderate anxiety,   15+ = severe anxiety. Recommend referral to behavioral health for scores 10 or greater.        11/20/2024     9:54 AM 7/24/2024     3:06 PM 2/19/2024    11:26 AM 12/18/2023    11:28 AM 4/26/2023    10:25 AM 8/24/2022     9:47 AM 8/3/2022

## 2024-11-20 NOTE — PATIENT INSTRUCTIONS
Personal Thought  Control.  Our thinking often creates anxiety for us.  Getting better control of our thinking can go a long way in helping us cope.  The following steps can be useful.   Let yourself become aware of thoughts you have when you are anxious.  What are the words that you are saying to yourself at that moment?  Sometimes it takes a little practice before we become aware of our thoughts.  Some examples might be:  “I know something bad is going to happen,” or This is horrible” or “Why is this happening to me!?”  Write your thoughts down.  It’s much easier to work with our thoughts, analyze them, and replace them if they are “in black and white.”  Ask yourself the following questions about your thoughts:  Is it true?  (Is it logically correct?  Where is the evidence to support the truth of that thought?  Are there alternative ways of thinking that would be more correct?).  If a thought is not as true as it could be, replace it with a more realistic and helpful one.  The majority of thoughts we have that generate anxiety are not the most realistic appraisals of the situation.   So what?  (If this is logically correct, what does it mean to me?  Is there anything I can do about the situation?  Is it in my best interest to get anxious about this?).   Use coping self-statements.  When feeling anxious, you may be able to tell yourself automatic phrases without thinking too much about it.  A couple of examples would be phrases such as “It’s OK, I can handle it,” or “I’ve been through things like this before and have done all right.”  Notice that these statements tend to be true for all of us.   Notice a change in your emotional state as you change your thinking.  As your thoughts become more realistic, you will probably notice a decrease in anxiety and tension, and an increase in your ability to cope.                Thinking Errors That Increase Stress, Anger, Depression, Anxiety, and Worry    All-or-Nothing

## 2024-12-31 RX ORDER — BUSPIRONE HYDROCHLORIDE 10 MG/1
10 TABLET ORAL 2 TIMES DAILY
Qty: 180 TABLET | Refills: 0 | Status: SHIPPED | OUTPATIENT
Start: 2024-12-31 | End: 2025-03-31

## 2025-01-03 ENCOUNTER — TELEMEDICINE (OUTPATIENT)
Dept: PSYCHIATRY | Age: 25
End: 2025-01-03

## 2025-01-03 DIAGNOSIS — F41.0 PANIC DISORDER: Primary | ICD-10-CM

## 2025-01-03 DIAGNOSIS — F41.1 GAD (GENERALIZED ANXIETY DISORDER): ICD-10-CM

## 2025-01-03 NOTE — PROGRESS NOTES
minutes; rinse. May use up to 10 mL for longer hair. Repeat twice weekly for 4 weeks; allow a minimum of 3 days between applications 7/25/24   Shreya Leija APRN   Cholecalciferol (VITAMIN D3) 50 MCG (2000 UT) TABS Take 1 tablet by mouth daily 7/24/24   Shreya Leija APRN        Past psych Medication Trials:  Zoloft ( ^ SI), Wellbutrin XL ( tired for few- no issues with it.)            11/20/2024     9:55 AM 7/24/2024     3:06 PM 12/18/2023    11:00 AM 4/26/2023    10:25 AM 8/24/2022     9:48 AM   MARLENA 7 SCORE   MARLENA-7 Total Score 7 12 4 11 16       Interpretation of MARLENA-7 score: 5-9 = mild anxiety, 10-14 = moderate anxiety,   15+ = severe anxiety. Recommend referral to behavioral health for scores 10 or greater.        11/20/2024     9:54 AM 7/24/2024     3:06 PM 2/19/2024    11:26 AM 12/18/2023    11:28 AM 4/26/2023    10:25 AM 8/24/2022     9:47 AM 8/3/2022    10:31 AM   PHQ Scores   PHQ2 Score 0 0 0 1 1 1 6   PHQ9 Score 5 6 0 2 10 10 17     Interpretation of PHQ-9 score:  1-4 = minimal depression, 5-9 = mild depression,   10-14 = moderate depression; 15-19 = moderately severe depression, 20-27 = severe depression    Psychiatric Exam         Attention and Perception: attentive      Mood and Affect:  crying at times, anxious      Speech:  spontaneous      Behavior:  calm and cooperative        Cognition and Memory:  intact      Judgment: Intact      Fund of knowledge: Intact     I have spent >  27 mins with this patient on working on coping skills and med mgt, and > 1/2 of that time was spent counseling and educating patient about disease process, prognosis, and medications compliance     Safety plan  Discussed and informed patient to call 911, 988, or go to nearest emergency room if patient experiences SI/HI immediately.  In addition, Patient educated to use the National Suicide Hotlines: 7-020-858-TALK (1-823.855.3149) and 3-790-VPMGFQG (1-332.723.4824) and Local psychiatric Emergency Services.     Nereida Llanos

## 2025-01-09 ENCOUNTER — TELEMEDICINE (OUTPATIENT)
Dept: PSYCHIATRY | Age: 25
End: 2025-01-09
Payer: COMMERCIAL

## 2025-01-09 DIAGNOSIS — F32.A DEPRESSION, UNSPECIFIED DEPRESSION TYPE: Primary | ICD-10-CM

## 2025-01-09 DIAGNOSIS — F41.0 PANIC DISORDER: ICD-10-CM

## 2025-01-09 DIAGNOSIS — F41.1 GAD (GENERALIZED ANXIETY DISORDER): ICD-10-CM

## 2025-01-09 PROCEDURE — G8428 CUR MEDS NOT DOCUMENT: HCPCS | Performed by: REGISTERED NURSE

## 2025-01-09 PROCEDURE — 99213 OFFICE O/P EST LOW 20 MIN: CPT | Performed by: REGISTERED NURSE

## 2025-01-09 PROCEDURE — 1036F TOBACCO NON-USER: CPT | Performed by: REGISTERED NURSE

## 2025-01-09 PROCEDURE — G8417 CALC BMI ABV UP PARAM F/U: HCPCS | Performed by: REGISTERED NURSE

## 2025-01-09 RX ORDER — ESCITALOPRAM OXALATE 10 MG/1
10 TABLET ORAL DAILY
Qty: 30 TABLET | Refills: 1 | Status: SHIPPED | OUTPATIENT
Start: 2025-01-09

## 2025-01-09 ASSESSMENT — ANXIETY QUESTIONNAIRES
3. WORRYING TOO MUCH ABOUT DIFFERENT THINGS: NEARLY EVERY DAY
4. TROUBLE RELAXING: MORE THAN HALF THE DAYS
7. FEELING AFRAID AS IF SOMETHING AWFUL MIGHT HAPPEN: MORE THAN HALF THE DAYS
2. NOT BEING ABLE TO STOP OR CONTROL WORRYING: NEARLY EVERY DAY
5. BEING SO RESTLESS THAT IT IS HARD TO SIT STILL: NOT AT ALL
1. FEELING NERVOUS, ANXIOUS, OR ON EDGE: NEARLY EVERY DAY
GAD7 TOTAL SCORE: 15
6. BECOMING EASILY ANNOYED OR IRRITABLE: MORE THAN HALF THE DAYS

## 2025-01-09 ASSESSMENT — PATIENT HEALTH QUESTIONNAIRE - PHQ9
1. LITTLE INTEREST OR PLEASURE IN DOING THINGS: NOT AT ALL
SUM OF ALL RESPONSES TO PHQ QUESTIONS 1-9: 10
4. FEELING TIRED OR HAVING LITTLE ENERGY: SEVERAL DAYS
SUM OF ALL RESPONSES TO PHQ9 QUESTIONS 1 & 2: 1
2. FEELING DOWN, DEPRESSED OR HOPELESS: SEVERAL DAYS
SUM OF ALL RESPONSES TO PHQ QUESTIONS 1-9: 10
3. TROUBLE FALLING OR STAYING ASLEEP: MORE THAN HALF THE DAYS
SUM OF ALL RESPONSES TO PHQ QUESTIONS 1-9: 9
7. TROUBLE CONCENTRATING ON THINGS, SUCH AS READING THE NEWSPAPER OR WATCHING TELEVISION: SEVERAL DAYS
SUM OF ALL RESPONSES TO PHQ QUESTIONS 1-9: 10
9. THOUGHTS THAT YOU WOULD BE BETTER OFF DEAD, OR OF HURTING YOURSELF: SEVERAL DAYS
6. FEELING BAD ABOUT YOURSELF - OR THAT YOU ARE A FAILURE OR HAVE LET YOURSELF OR YOUR FAMILY DOWN: MORE THAN HALF THE DAYS
8. MOVING OR SPEAKING SO SLOWLY THAT OTHER PEOPLE COULD HAVE NOTICED. OR THE OPPOSITE, BEING SO FIGETY OR RESTLESS THAT YOU HAVE BEEN MOVING AROUND A LOT MORE THAN USUAL: NOT AT ALL
5. POOR APPETITE OR OVEREATING: MORE THAN HALF THE DAYS

## 2025-01-09 NOTE — PROGRESS NOTES
oriented. Discussed ways to deal with stressful situations using CBT techniques. Had SI yesterday. Sounds she does not meet clinical depression at this moment. Denies SI/HI/AVH at this time. Discussed safety plan. She has small group of friends she can reach out to as well. Planning to travel to [place] to see her mom for few days. She will attend graduate school at . All her classes this semester will be online. Encountered to update me her progress.      Interim message: see my chart messages from 12/31/24    Home Medications:  Prior to Admission medications    Medication Sig Start Date End Date Taking? Authorizing Provider   escitalopram (LEXAPRO) 10 MG tablet Take 1 tablet by mouth daily 1/9/25  Yes Ambrose Dumont APRN - CNP   busPIRone (BUSPAR) 10 MG tablet Take 1 tablet by mouth 2 times daily TAKE ONE TABLET BUSPAR 10 MG TWO TIMES DAILY 12/31/24 3/31/25  Ambrose Dumont APRN - CNP   hydrOXYzine HCl (ATARAX) 25 MG tablet Take 1 tablet by mouth 3 times daily as needed for Anxiety TAKE ONE TABLET BY MOUTH EVERY 8 HOURS AS NEEDED FOR ANXIETY 11/20/24 2/18/25  Ambrose Dumont APRN - CNP   ferrous sulfate (IRON 325) 325 (65 Fe) MG tablet Take 1 tablet by mouth daily (with breakfast) 7/26/24   Shreya Leija APRN   Ciclopirox 1 % SHAM Apply ~5 mL to wet hair; lather, and leave on hair and scalp for ~3 minutes; rinse. May use up to 10 mL for longer hair. Repeat twice weekly for 4 weeks; allow a minimum of 3 days between applications 7/25/24   Shreya Leija APRN   Cholecalciferol (VITAMIN D3) 50 MCG (2000 UT) TABS Take 1 tablet by mouth daily 7/24/24   Shreya Leija APRN        Past psych Medication Trials:  Zoloft ( ^ SI), Wellbutrin XL ( tired for few- no issues with it.)            1/9/2025    10:00 AM 11/20/2024     9:55 AM 7/24/2024     3:06 PM 12/18/2023    11:00 AM 4/26/2023    10:25 AM 8/24/2022     9:48 AM   MARLENA 7 SCORE   MARLENA-7 Total Score 15 7 12 4 11 16       Interpretation of MARLENA-7 score:

## 2025-03-17 RX ORDER — ESCITALOPRAM OXALATE 10 MG/1
10 TABLET ORAL DAILY
Qty: 30 TABLET | Refills: 1 | OUTPATIENT
Start: 2025-03-17

## 2025-03-17 RX ORDER — ESCITALOPRAM OXALATE 10 MG/1
10 TABLET ORAL DAILY
Qty: 30 TABLET | Refills: 1 | Status: SHIPPED | OUTPATIENT
Start: 2025-03-17

## 2025-03-21 ENCOUNTER — TELEMEDICINE (OUTPATIENT)
Dept: PSYCHIATRY | Age: 25
End: 2025-03-21
Payer: COMMERCIAL

## 2025-03-21 DIAGNOSIS — F41.1 GAD (GENERALIZED ANXIETY DISORDER): ICD-10-CM

## 2025-03-21 DIAGNOSIS — F32.A DEPRESSION, UNSPECIFIED DEPRESSION TYPE: Primary | ICD-10-CM

## 2025-03-21 DIAGNOSIS — F41.0 PANIC DISORDER: ICD-10-CM

## 2025-03-21 PROCEDURE — G8417 CALC BMI ABV UP PARAM F/U: HCPCS | Performed by: REGISTERED NURSE

## 2025-03-21 PROCEDURE — 99213 OFFICE O/P EST LOW 20 MIN: CPT | Performed by: REGISTERED NURSE

## 2025-03-21 PROCEDURE — G8428 CUR MEDS NOT DOCUMENT: HCPCS | Performed by: REGISTERED NURSE

## 2025-03-21 PROCEDURE — 1036F TOBACCO NON-USER: CPT | Performed by: REGISTERED NURSE
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
## 2025-03-24 DIAGNOSIS — K92.1 BLOODY STOOL: Primary | ICD-10-CM

## 2025-04-29 RX ORDER — BUSPIRONE HYDROCHLORIDE 10 MG/1
10 TABLET ORAL 2 TIMES DAILY
Qty: 180 TABLET | Refills: 0 | Status: SHIPPED | OUTPATIENT
Start: 2025-04-29

## 2025-04-29 NOTE — TELEPHONE ENCOUNTER
Medication:   Requested Prescriptions     Pending Prescriptions Disp Refills    busPIRone (BUSPAR) 10 MG tablet [Pharmacy Med Name: BUSPIRONE 10MG TABLETS] 180 tablet 0     Sig: TAKE 1 TABLET BY MOUTH TWICE DAILY        Last Filled:      Patient Phone Number: [phone] (home)     Last appt: 3/21/25  Next appt: 6/27/25    Last OARRS:        No data to display                Medication:   Requested Prescriptions     Pending Prescriptions Disp Refills    busPIRone (BUSPAR) 10 MG tablet [Pharmacy Med Name: BUSPIRONE 10MG TABLETS] 180 tablet 0     Sig: TAKE 1 TABLET BY MOUTH TWICE DAILY        Last Filled:      Patient Phone Number: [phone] (home)     Last appt: Visit date not found   Next appt: Visit date not found    Last OARRS:        No data to display

## 2025-05-22 DIAGNOSIS — E55.9 VITAMIN D DEFICIENCY: ICD-10-CM

## 2025-05-22 RX ORDER — CHOLECALCIFEROL (VITAMIN D3) 50 MCG
1 TABLET ORAL DAILY
Qty: 30 TABLET | Refills: 1 | Status: SHIPPED | OUTPATIENT
Start: 2025-05-22

## 2025-05-22 RX ORDER — ESCITALOPRAM OXALATE 10 MG/1
10 TABLET ORAL DAILY
Qty: 90 TABLET | Refills: 0 | Status: SHIPPED | OUTPATIENT
Start: 2025-05-22

## 2025-05-22 RX ORDER — ESCITALOPRAM OXALATE 10 MG/1
10 TABLET ORAL DAILY
Qty: 30 TABLET | Refills: 1 | Status: CANCELLED | OUTPATIENT
Start: 2025-05-22

## 2025-05-22 NOTE — TELEPHONE ENCOUNTER
Medication:   Requested Prescriptions     Pending Prescriptions Disp Refills    Cholecalciferol (VITAMIN D3) 50 MCG (2000 UT) TABS 30 tablet 11     Sig: Take 1 tablet by mouth daily        Last Filled:      Patient Phone Number: [phone] (home)     Last appt: 7/25/2024   Next appt: Visit date not found    Last OARRS:        No data to display

## 2025-07-11 ENCOUNTER — TELEMEDICINE (OUTPATIENT)
Dept: PSYCHIATRY | Age: 25
End: 2025-07-11
Payer: COMMERCIAL

## 2025-07-11 DIAGNOSIS — F32.A DEPRESSION, UNSPECIFIED DEPRESSION TYPE: Primary | ICD-10-CM

## 2025-07-11 DIAGNOSIS — F41.1 GAD (GENERALIZED ANXIETY DISORDER): ICD-10-CM

## 2025-07-11 PROCEDURE — G8428 CUR MEDS NOT DOCUMENT: HCPCS | Performed by: REGISTERED NURSE

## 2025-07-11 PROCEDURE — 99213 OFFICE O/P EST LOW 20 MIN: CPT | Performed by: REGISTERED NURSE

## 2025-07-11 PROCEDURE — 1036F TOBACCO NON-USER: CPT | Performed by: REGISTERED NURSE

## 2025-07-11 PROCEDURE — G8417 CALC BMI ABV UP PARAM F/U: HCPCS | Performed by: REGISTERED NURSE

## 2025-07-11 RX ORDER — BUSPIRONE HYDROCHLORIDE 5 MG/1
5 TABLET ORAL 2 TIMES DAILY
Qty: 180 TABLET | Refills: 0 | Status: SHIPPED | OUTPATIENT
Start: 2025-07-11 | End: 2025-10-09

## 2025-07-11 RX ORDER — ESCITALOPRAM OXALATE 10 MG/1
10 TABLET ORAL DAILY
Qty: 90 TABLET | Refills: 0 | Status: SHIPPED | OUTPATIENT
Start: 2025-07-11

## 2025-07-11 ASSESSMENT — PATIENT HEALTH QUESTIONNAIRE - PHQ9
SUM OF ALL RESPONSES TO PHQ QUESTIONS 1-9: 0
5. POOR APPETITE OR OVEREATING: NOT AT ALL
7. TROUBLE CONCENTRATING ON THINGS, SUCH AS READING THE NEWSPAPER OR WATCHING TELEVISION: NOT AT ALL
SUM OF ALL RESPONSES TO PHQ QUESTIONS 1-9: 0
1. LITTLE INTEREST OR PLEASURE IN DOING THINGS: NOT AT ALL
9. THOUGHTS THAT YOU WOULD BE BETTER OFF DEAD, OR OF HURTING YOURSELF: NOT AT ALL
8. MOVING OR SPEAKING SO SLOWLY THAT OTHER PEOPLE COULD HAVE NOTICED. OR THE OPPOSITE - BEING SO FIDGETY OR RESTLESS THAT YOU HAVE BEEN MOVING AROUND A LOT MORE THAN USUAL: NOT AT ALL
4. FEELING TIRED OR HAVING LITTLE ENERGY: NOT AT ALL
6. FEELING BAD ABOUT YOURSELF - OR THAT YOU ARE A FAILURE OR HAVE LET YOURSELF OR YOUR FAMILY DOWN: NOT AT ALL
10. IF YOU CHECKED OFF ANY PROBLEMS, HOW DIFFICULT HAVE THESE PROBLEMS MADE IT FOR YOU TO DO YOUR WORK, TAKE CARE OF THINGS AT HOME, OR GET ALONG WITH OTHER PEOPLE: NOT DIFFICULT AT ALL
2. FEELING DOWN, DEPRESSED OR HOPELESS: NOT AT ALL
7. TROUBLE CONCENTRATING ON THINGS, SUCH AS READING THE NEWSPAPER OR WATCHING TELEVISION: NOT AT ALL
6. FEELING BAD ABOUT YOURSELF - OR THAT YOU ARE A FAILURE OR HAVE LET YOURSELF OR YOUR FAMILY DOWN: NOT AT ALL
SUM OF ALL RESPONSES TO PHQ QUESTIONS 1-9: 0
1. LITTLE INTEREST OR PLEASURE IN DOING THINGS: NOT AT ALL
3. TROUBLE FALLING OR STAYING ASLEEP: NOT AT ALL
SUM OF ALL RESPONSES TO PHQ QUESTIONS 1-9: 0
2. FEELING DOWN, DEPRESSED OR HOPELESS: NOT AT ALL
9. THOUGHTS THAT YOU WOULD BE BETTER OFF DEAD, OR OF HURTING YOURSELF: NOT AT ALL
8. MOVING OR SPEAKING SO SLOWLY THAT OTHER PEOPLE COULD HAVE NOTICED. OR THE OPPOSITE, BEING SO FIGETY OR RESTLESS THAT YOU HAVE BEEN MOVING AROUND A LOT MORE THAN USUAL: NOT AT ALL
5. POOR APPETITE OR OVEREATING: NOT AT ALL
10. IF YOU CHECKED OFF ANY PROBLEMS, HOW DIFFICULT HAVE THESE PROBLEMS MADE IT FOR YOU TO DO YOUR WORK, TAKE CARE OF THINGS AT HOME, OR GET ALONG WITH OTHER PEOPLE: NOT DIFFICULT AT ALL
SUM OF ALL RESPONSES TO PHQ QUESTIONS 1-9: 0
3. TROUBLE FALLING OR STAYING ASLEEP: NOT AT ALL
4. FEELING TIRED OR HAVING LITTLE ENERGY: NOT AT ALL

## 2025-07-11 ASSESSMENT — ANXIETY QUESTIONNAIRES
4. TROUBLE RELAXING: NOT AT ALL
2. NOT BEING ABLE TO STOP OR CONTROL WORRYING: NOT AT ALL
2. NOT BEING ABLE TO STOP OR CONTROL WORRYING: NOT AT ALL
GAD7 TOTAL SCORE: 0
4. TROUBLE RELAXING: NOT AT ALL
IF YOU CHECKED OFF ANY PROBLEMS ON THIS QUESTIONNAIRE, HOW DIFFICULT HAVE THESE PROBLEMS MADE IT FOR YOU TO DO YOUR WORK, TAKE CARE OF THINGS AT HOME, OR GET ALONG WITH OTHER PEOPLE: NOT DIFFICULT AT ALL
7. FEELING AFRAID AS IF SOMETHING AWFUL MIGHT HAPPEN: NOT AT ALL
7. FEELING AFRAID AS IF SOMETHING AWFUL MIGHT HAPPEN: NOT AT ALL
1. FEELING NERVOUS, ANXIOUS, OR ON EDGE: NOT AT ALL
5. BEING SO RESTLESS THAT IT IS HARD TO SIT STILL: NOT AT ALL
3. WORRYING TOO MUCH ABOUT DIFFERENT THINGS: NOT AT ALL
IF YOU CHECKED OFF ANY PROBLEMS ON THIS QUESTIONNAIRE, HOW DIFFICULT HAVE THESE PROBLEMS MADE IT FOR YOU TO DO YOUR WORK, TAKE CARE OF THINGS AT HOME, OR GET ALONG WITH OTHER PEOPLE: NOT DIFFICULT AT ALL
1. FEELING NERVOUS, ANXIOUS, OR ON EDGE: NOT AT ALL
3. WORRYING TOO MUCH ABOUT DIFFERENT THINGS: NOT AT ALL
6. BECOMING EASILY ANNOYED OR IRRITABLE: NOT AT ALL
6. BECOMING EASILY ANNOYED OR IRRITABLE: NOT AT ALL
5. BEING SO RESTLESS THAT IT IS HARD TO SIT STILL: NOT AT ALL

## 2025-07-11 NOTE — PROGRESS NOTES
= mild depression,   10-14 = moderate depression; 15-19 = moderately severe depression, 20-27 = severe depression    Psychiatric Exam         Attention and Perception: attentive      Mood and Affect: \" good\"       Speech:  spontaneous      Behavior:  calm and cooperative        Cognition and Memory:  intact      Judgment: Intact      Fund of knowledge: Intact     I have spent > 28   mins with this patient on working on coping skills and med mgt, and > 1/2 of that time was spent counseling and educating patient about disease process, prognosis, and medications compliance     Safety plan  Discussed and informed patient to call 911, 988, or go to nearest emergency room if patient experiences SI/HI immediately.  In addition, Patient educated to use the National Suicide Hotlines: 9-920-889-TALK (1-186.147.5945) and 6-725-LFXENNS (1-627.332.8686) and Local psychiatric Emergency Services.     Nereida Llanos, was evaluated through a synchronous (real-time) audio-video encounter. The patient (or guardian if applicable) is aware that this is a billable service, which includes applicable co-pays. This Virtual Visit was conducted with patient's (and/or legal guardian's) consent. Patient identification was verified, and a caregiver was present when appropriate.   The patient was located at Home: [address] [place] [address]  Provider was located at [place], [place]   Confirm you are appropriately licensed, registered, or certified to deliver care in the state where the patient is located as indicated above. If you are not or unsure, please re-schedule the visit: Yes, I confirm.        --NATALIYA Patel CNP on 7/11/2025 at 12:34 PM    An electronic signature was used to authenticate this note.

## 2025-08-15 ENCOUNTER — TELEMEDICINE (OUTPATIENT)
Dept: PSYCHIATRY | Age: 25
End: 2025-08-15
Payer: COMMERCIAL

## 2025-08-15 DIAGNOSIS — F32.A DEPRESSION, UNSPECIFIED DEPRESSION TYPE: Primary | ICD-10-CM

## 2025-08-15 DIAGNOSIS — F41.1 GAD (GENERALIZED ANXIETY DISORDER): ICD-10-CM

## 2025-08-15 DIAGNOSIS — F41.0 PANIC DISORDER: ICD-10-CM

## 2025-08-15 PROCEDURE — 99213 OFFICE O/P EST LOW 20 MIN: CPT | Performed by: REGISTERED NURSE

## 2025-08-15 PROCEDURE — G8421 BMI NOT CALCULATED: HCPCS | Performed by: REGISTERED NURSE

## 2025-08-15 PROCEDURE — G8428 CUR MEDS NOT DOCUMENT: HCPCS | Performed by: REGISTERED NURSE

## 2025-08-15 PROCEDURE — 1036F TOBACCO NON-USER: CPT | Performed by: REGISTERED NURSE

## 2025-08-15 ASSESSMENT — PATIENT HEALTH QUESTIONNAIRE - PHQ9
SUM OF ALL RESPONSES TO PHQ QUESTIONS 1-9: 4
10. IF YOU CHECKED OFF ANY PROBLEMS, HOW DIFFICULT HAVE THESE PROBLEMS MADE IT FOR YOU TO DO YOUR WORK, TAKE CARE OF THINGS AT HOME, OR GET ALONG WITH OTHER PEOPLE: NOT DIFFICULT AT ALL
3. TROUBLE FALLING OR STAYING ASLEEP: SEVERAL DAYS
2. FEELING DOWN, DEPRESSED OR HOPELESS: NOT AT ALL
1. LITTLE INTEREST OR PLEASURE IN DOING THINGS: NOT AT ALL
9. THOUGHTS THAT YOU WOULD BE BETTER OFF DEAD, OR OF HURTING YOURSELF: NOT AT ALL
1. LITTLE INTEREST OR PLEASURE IN DOING THINGS: NOT AT ALL
SUM OF ALL RESPONSES TO PHQ QUESTIONS 1-9: 4
5. POOR APPETITE OR OVEREATING: SEVERAL DAYS
3. TROUBLE FALLING OR STAYING ASLEEP: SEVERAL DAYS
SUM OF ALL RESPONSES TO PHQ QUESTIONS 1-9: 4
2. FEELING DOWN, DEPRESSED OR HOPELESS: NOT AT ALL
7. TROUBLE CONCENTRATING ON THINGS, SUCH AS READING THE NEWSPAPER OR WATCHING TELEVISION: NOT AT ALL
5. POOR APPETITE OR OVEREATING: SEVERAL DAYS
9. THOUGHTS THAT YOU WOULD BE BETTER OFF DEAD, OR OF HURTING YOURSELF: NOT AT ALL
SUM OF ALL RESPONSES TO PHQ QUESTIONS 1-9: 4
SUM OF ALL RESPONSES TO PHQ QUESTIONS 1-9: 4
6. FEELING BAD ABOUT YOURSELF - OR THAT YOU ARE A FAILURE OR HAVE LET YOURSELF OR YOUR FAMILY DOWN: SEVERAL DAYS
8. MOVING OR SPEAKING SO SLOWLY THAT OTHER PEOPLE COULD HAVE NOTICED. OR THE OPPOSITE - BEING SO FIDGETY OR RESTLESS THAT YOU HAVE BEEN MOVING AROUND A LOT MORE THAN USUAL: NOT AT ALL
10. IF YOU CHECKED OFF ANY PROBLEMS, HOW DIFFICULT HAVE THESE PROBLEMS MADE IT FOR YOU TO DO YOUR WORK, TAKE CARE OF THINGS AT HOME, OR GET ALONG WITH OTHER PEOPLE: NOT DIFFICULT AT ALL
7. TROUBLE CONCENTRATING ON THINGS, SUCH AS READING THE NEWSPAPER OR WATCHING TELEVISION: NOT AT ALL
4. FEELING TIRED OR HAVING LITTLE ENERGY: SEVERAL DAYS
4. FEELING TIRED OR HAVING LITTLE ENERGY: SEVERAL DAYS
8. MOVING OR SPEAKING SO SLOWLY THAT OTHER PEOPLE COULD HAVE NOTICED. OR THE OPPOSITE, BEING SO FIGETY OR RESTLESS THAT YOU HAVE BEEN MOVING AROUND A LOT MORE THAN USUAL: NOT AT ALL
6. FEELING BAD ABOUT YOURSELF - OR THAT YOU ARE A FAILURE OR HAVE LET YOURSELF OR YOUR FAMILY DOWN: SEVERAL DAYS

## 2025-08-15 ASSESSMENT — ANXIETY QUESTIONNAIRES
3. WORRYING TOO MUCH ABOUT DIFFERENT THINGS: SEVERAL DAYS
1. FEELING NERVOUS, ANXIOUS, OR ON EDGE: SEVERAL DAYS
IF YOU CHECKED OFF ANY PROBLEMS ON THIS QUESTIONNAIRE, HOW DIFFICULT HAVE THESE PROBLEMS MADE IT FOR YOU TO DO YOUR WORK, TAKE CARE OF THINGS AT HOME, OR GET ALONG WITH OTHER PEOPLE: NOT DIFFICULT AT ALL
3. WORRYING TOO MUCH ABOUT DIFFERENT THINGS: SEVERAL DAYS
6. BECOMING EASILY ANNOYED OR IRRITABLE: SEVERAL DAYS
2. NOT BEING ABLE TO STOP OR CONTROL WORRYING: SEVERAL DAYS
GAD7 TOTAL SCORE: 6
IF YOU CHECKED OFF ANY PROBLEMS ON THIS QUESTIONNAIRE, HOW DIFFICULT HAVE THESE PROBLEMS MADE IT FOR YOU TO DO YOUR WORK, TAKE CARE OF THINGS AT HOME, OR GET ALONG WITH OTHER PEOPLE: NOT DIFFICULT AT ALL
4. TROUBLE RELAXING: SEVERAL DAYS
7. FEELING AFRAID AS IF SOMETHING AWFUL MIGHT HAPPEN: SEVERAL DAYS
6. BECOMING EASILY ANNOYED OR IRRITABLE: SEVERAL DAYS
5. BEING SO RESTLESS THAT IT IS HARD TO SIT STILL: NOT AT ALL
4. TROUBLE RELAXING: SEVERAL DAYS
7. FEELING AFRAID AS IF SOMETHING AWFUL MIGHT HAPPEN: SEVERAL DAYS
5. BEING SO RESTLESS THAT IT IS HARD TO SIT STILL: NOT AT ALL
1. FEELING NERVOUS, ANXIOUS, OR ON EDGE: SEVERAL DAYS
2. NOT BEING ABLE TO STOP OR CONTROL WORRYING: SEVERAL DAYS

## 2025-08-24 RX ORDER — ESCITALOPRAM OXALATE 10 MG/1
10 TABLET ORAL DAILY
Qty: 90 TABLET | Refills: 0 | Status: SHIPPED | OUTPATIENT
Start: 2025-08-24

## 2025-08-24 RX ORDER — BUSPIRONE HYDROCHLORIDE 10 MG/1
10 TABLET ORAL DAILY
Qty: 90 TABLET | Refills: 0 | Status: SHIPPED | OUTPATIENT
Start: 2025-08-24 | End: 2025-11-22